# Patient Record
Sex: MALE | Race: WHITE | Employment: FULL TIME | URBAN - METROPOLITAN AREA
[De-identification: names, ages, dates, MRNs, and addresses within clinical notes are randomized per-mention and may not be internally consistent; named-entity substitution may affect disease eponyms.]

---

## 2017-08-14 PROBLEM — J18.9 PNEUMONIA: Status: ACTIVE | Noted: 2017-08-14

## 2017-08-14 PROBLEM — J45.901 ASTHMA EXACERBATION: Status: ACTIVE | Noted: 2017-08-14

## 2017-08-14 PROBLEM — R74.8 ELEVATED LIVER ENZYMES: Status: ACTIVE | Noted: 2017-08-14

## 2017-08-14 PROBLEM — A41.9 SEPSIS (HCC): Status: ACTIVE | Noted: 2017-08-14

## 2020-07-02 ENCOUNTER — HOSPITAL ENCOUNTER (OUTPATIENT)
Age: 25
Discharge: HOME | End: 2020-07-02
Payer: COMMERCIAL

## 2020-07-02 ENCOUNTER — HOSPITAL ENCOUNTER (OUTPATIENT)
Dept: GENERAL RADIOLOGY | Age: 25
Discharge: HOME | End: 2020-07-02
Payer: COMMERCIAL

## 2020-07-02 PROCEDURE — 73560 X-RAY EXAM OF KNEE 1 OR 2: CPT

## 2021-01-26 ENCOUNTER — OFFICE VISIT (OUTPATIENT)
Dept: FAMILY MEDICINE CLINIC | Age: 26
End: 2021-01-26
Payer: COMMERCIAL

## 2021-01-26 VITALS
HEART RATE: 74 BPM | WEIGHT: 210 LBS | OXYGEN SATURATION: 98 % | SYSTOLIC BLOOD PRESSURE: 108 MMHG | HEIGHT: 70 IN | DIASTOLIC BLOOD PRESSURE: 66 MMHG | TEMPERATURE: 97.1 F | BODY MASS INDEX: 30.06 KG/M2

## 2021-01-26 DIAGNOSIS — E55.9 VITAMIN D DEFICIENCY: ICD-10-CM

## 2021-01-26 DIAGNOSIS — Z91.018 MULTIPLE FOOD ALLERGIES: ICD-10-CM

## 2021-01-26 DIAGNOSIS — K76.0 FATTY LIVER: Primary | ICD-10-CM

## 2021-01-26 PROCEDURE — 99203 OFFICE O/P NEW LOW 30 MIN: CPT | Performed by: NURSE PRACTITIONER

## 2021-01-26 SDOH — HEALTH STABILITY: MENTAL HEALTH: HOW OFTEN DO YOU HAVE A DRINK CONTAINING ALCOHOL?: MONTHLY OR LESS

## 2021-01-26 SDOH — HEALTH STABILITY: MENTAL HEALTH: HOW MANY STANDARD DRINKS CONTAINING ALCOHOL DO YOU HAVE ON A TYPICAL DAY?: 1 OR 2

## 2021-01-26 ASSESSMENT — ENCOUNTER SYMPTOMS
DIARRHEA: 0
COUGH: 0
CONSTIPATION: 0
BLOOD IN STOOL: 0
SHORTNESS OF BREATH: 0
CHEST TIGHTNESS: 0

## 2021-01-26 ASSESSMENT — PATIENT HEALTH QUESTIONNAIRE - PHQ9
SUM OF ALL RESPONSES TO PHQ QUESTIONS 1-9: 1

## 2021-01-26 NOTE — PROGRESS NOTES
1/26/2021    Chief Complaint   Patient presents with   Lindaann Bernheim Doctor     seen someone in [address] [place] [address] Ave remember who     Other     last July he had bloodwork at his pcp and they told him he had elevated chol and possible fatty liver, they gave him chol med but he never took it because he has allergies to alot of fillers in medications and they never really told him about the med and he didnt like that. He did lose weight and started eating better so he would like this rechecked     Other     not fasting     Other     has always had a vitamin d defeicency so he needs that checked        Marcus Patel is a 26 y.o. male, presents today for:    HPI   New patient to practice. Mother comes to this practice. Concerned about prior dx of fatty liver disease.  in 2017 but no labs for comparison. Since 2017 patient has changed his diet and lost 20 lbs. No abdominal pain/ GERD, diarrhea, constipation, N/V. Frequent Vitamin D deficiency, has needed 50,000 IU in the past.  No new fatigue. Hx numerous food and environmental allergies. Previously given EpiPen, does not need one today. Needs referral to new Allergist.      Not currently sexually active, never in the past    Review of Systems   Constitutional: Negative. Respiratory: Negative for cough, chest tightness and shortness of breath. Cardiovascular: Negative. Gastrointestinal: Negative for blood in stool, constipation and diarrhea. Musculoskeletal:        Intermittent knee pain due to accident   Skin: Negative. Neurological: Negative for dizziness, tremors, light-headedness and headaches. Psychiatric/Behavioral: Negative for decreased concentration, dysphoric mood, self-injury, sleep disturbance and suicidal ideas. The patient is not nervous/anxious.         Current Outpatient Medications on File Prior to Visit   Medication Sig Dispense Refill    CALCIUM-VITAMIN D PO Take 1 tablet by mouth daily      Cholecalciferol (VITAMIN D3 PO) Take 1 tablet by mouth daily      albuterol (PROVENTIL) (2.5 MG/3ML) 0.083% nebulizer solution Take 3 mLs by nebulization every 6 hours as needed for Wheezing 360 each 1    cetirizine (ZYRTEC) 10 MG tablet Take 10 mg by mouth daily      ALBUTEROL SULFATE IN Inhale into the lungs as needed      EPINEPHrine HCl, Anaphylaxis, (EPIPEN IM) Inject into the muscle as needed       No current facility-administered medications on file prior to visit. Allergies   Allergen Reactions    Latex     Eggs Or Egg-Derived Products Anaphylaxis    Milk-Related Compounds Anaphylaxis    Other Anaphylaxis     Tree nuts - anaphylaxis, Melon -rash, Peas-n/v.      Peanut-Containing Drug Products Anaphylaxis    Shellfish-Derived Products Anaphylaxis    Pork-Derived Products Nausea And Vomiting     Past Medical History:   Diagnosis Date    Asthma     Eosinophilic esophagitis      Past Surgical History:   Procedure Laterality Date    HYDROCELE EXCISION      KNEE SURGERY      SINUS SURGERY      TONSILLECTOMY        Social History     Tobacco Use    Smoking status: Never Smoker    Smokeless tobacco: Never Used   Substance Use Topics    Alcohol use: Yes     Frequency: Monthly or less     Drinks per session: 1 or 2     Binge frequency: Never     Comment: rarely      History reviewed. No pertinent family history. Vitals:    01/26/21 1512   BP: 108/66   Pulse: 74   Temp: 97.1 °F (36.2 °C)   TempSrc: Infrared   SpO2: 98%   Weight: 210 lb (95.3 kg)   Height: 5' 10\" (1.778 m)     Estimated body mass index is 30.13 kg/m² as calculated from the following:    Height as of this encounter: 5' 10\" (1.778 m). Weight as of this encounter: 210 lb (95.3 kg). Physical Exam  Vitals signs reviewed. Constitutional:       Appearance: Normal appearance. HENT:      Head: Normocephalic. Neck:      Vascular: No carotid bruit. Cardiovascular:      Rate and Rhythm: Normal rate and regular rhythm.

## 2021-01-27 ENCOUNTER — NURSE ONLY (OUTPATIENT)
Dept: FAMILY MEDICINE CLINIC | Age: 26
End: 2021-01-27
Payer: COMMERCIAL

## 2021-01-27 DIAGNOSIS — K76.0 FATTY LIVER: ICD-10-CM

## 2021-01-27 DIAGNOSIS — E55.9 VITAMIN D DEFICIENCY: ICD-10-CM

## 2021-01-27 LAB
A/G RATIO: 2.2 (ref 1.1–2.2)
ALBUMIN SERPL-MCNC: 5 G/DL (ref 3.4–5)
ALP BLD-CCNC: 79 U/L (ref 40–129)
ALT SERPL-CCNC: 29 U/L (ref 10–40)
ANION GAP SERPL CALCULATED.3IONS-SCNC: 13 MMOL/L (ref 3–16)
AST SERPL-CCNC: 17 U/L (ref 15–37)
BILIRUB SERPL-MCNC: 1.2 MG/DL (ref 0–1)
BUN BLDV-MCNC: 8 MG/DL (ref 7–20)
CALCIUM SERPL-MCNC: 10 MG/DL (ref 8.3–10.6)
CHLORIDE BLD-SCNC: 102 MMOL/L (ref 99–110)
CHOLESTEROL, TOTAL: 147 MG/DL (ref 0–199)
CO2: 26 MMOL/L (ref 21–32)
CREAT SERPL-MCNC: 0.8 MG/DL (ref 0.9–1.3)
GFR AFRICAN AMERICAN: >60
GFR NON-AFRICAN AMERICAN: >60
GLOBULIN: 2.3 G/DL
GLUCOSE BLD-MCNC: 89 MG/DL (ref 70–99)
HDLC SERPL-MCNC: 37 MG/DL (ref 40–60)
LDL CHOLESTEROL CALCULATED: 75 MG/DL
POTASSIUM SERPL-SCNC: 4.2 MMOL/L (ref 3.5–5.1)
SODIUM BLD-SCNC: 141 MMOL/L (ref 136–145)
TOTAL PROTEIN: 7.3 G/DL (ref 6.4–8.2)
TRIGL SERPL-MCNC: 174 MG/DL (ref 0–150)
VITAMIN D 25-HYDROXY: 26.5 NG/ML
VLDLC SERPL CALC-MCNC: 35 MG/DL

## 2021-01-27 PROCEDURE — 36415 COLL VENOUS BLD VENIPUNCTURE: CPT | Performed by: NURSE PRACTITIONER

## 2021-01-27 NOTE — PROGRESS NOTES
Blood drawn per order. Needle size: 23 g  Site: R Upper Arm. First attempt successful Yes    Second attempt no    Pressure applied until bleeding stopped. Yes applied. Patient informed to call office or return if bleeding reoccurs and unable to stop.     Tubes drawn: 0 purple     2 red

## 2021-08-16 ENCOUNTER — VIRTUAL VISIT (OUTPATIENT)
Dept: FAMILY MEDICINE CLINIC | Age: 26
End: 2021-08-16
Payer: COMMERCIAL

## 2021-08-16 DIAGNOSIS — J45.21 MILD INTERMITTENT ASTHMA WITH (ACUTE) EXACERBATION: ICD-10-CM

## 2021-08-16 DIAGNOSIS — U07.1 COVID-19: Primary | ICD-10-CM

## 2021-08-16 PROBLEM — A41.9 SEPSIS (HCC): Status: RESOLVED | Noted: 2017-08-14 | Resolved: 2021-08-16

## 2021-08-16 PROBLEM — J18.9 PNEUMONIA: Status: RESOLVED | Noted: 2017-08-14 | Resolved: 2021-08-16

## 2021-08-16 PROBLEM — J45.901 ASTHMA EXACERBATION: Status: RESOLVED | Noted: 2017-08-14 | Resolved: 2021-08-16

## 2021-08-16 PROBLEM — R74.8 ELEVATED LIVER ENZYMES: Status: RESOLVED | Noted: 2017-08-14 | Resolved: 2021-08-16

## 2021-08-16 PROCEDURE — 99213 OFFICE O/P EST LOW 20 MIN: CPT | Performed by: NURSE PRACTITIONER

## 2021-08-16 ASSESSMENT — ENCOUNTER SYMPTOMS
COUGH: 1
VOMITING: 0
NAUSEA: 1
SWOLLEN GLANDS: 0
CHANGE IN BOWEL HABIT: 0
VISUAL CHANGE: 0
SORE THROAT: 1

## 2021-08-16 NOTE — TELEPHONE ENCOUNTER
Patient needs regeneron infusion.   Need order printed off and faxed to Valeriano Adam at [id number] [facility] [phone] or [phone]

## 2021-08-16 NOTE — TELEPHONE ENCOUNTER
Office note reviewed. Tested positive for Covid today. Okay to send Regeneron order to IV infusion center at [address] [facility].   I can sign the order

## 2021-08-16 NOTE — PROGRESS NOTES
Jenelle Vargas (:  1995) is a 23 y.o. male,Established patient, here for evaluation of the following chief complaint(s): Positive For Covid-19 (got tested today at [facility] test is positive ), Pharyngitis (symptoms started wednesday ), Cough, and Nausea         ASSESSMENT/PLAN:  1. COVID-19  Will order Regeneron for patient given asthma history (with prior hospitalization)  Encouraged continued supportive care. Continue Mucinex DM PRN cough  Continue Albuterol PRN  2. Mild intermittent asthma with (acute) exacerbation  Due to COVID  See #1      Return if symptoms worsen or fail to improve. SUBJECTIVE/OBJECTIVE:  Pharyngitis  This is a new problem. Episode onset: 5 days ago. The problem occurs constantly (5-6x/ hours). The problem has been gradually worsening. Associated symptoms include congestion, coughing, fatigue, a fever (low grade 100 degrees), headaches, nausea, neck pain, a sore throat and weakness. Pertinent negatives include no anorexia, arthralgias, change in bowel habit, chest pain, chills, diaphoresis, joint swelling, myalgias, numbness, rash, swollen glands, urinary symptoms, vertigo, visual change or vomiting. The symptoms are aggravated by coughing. Treatments tried: sudafed, guaifenesin, zinc. The treatment provided mild relief. Cough  Associated symptoms include a fever (low grade 100 degrees), headaches and a sore throat. Pertinent negatives include no chest pain, chills, myalgias or rash. Phlegm- green/ clear. Tested positive today for covid. Did not receive COVID vaccination. Multiple family members have tested positive for COVID who live in his household. They all have asthma similar to him. Have all received IV Regeneron and have markedly improved symptoms. Pt is requesting to receive this as well. Review of Systems   Constitutional: Positive for fatigue and fever (low grade 100 degrees). Negative for chills and diaphoresis.    HENT: Positive for congestion and sore throat. Respiratory: Positive for cough. Cardiovascular: Negative for chest pain. Gastrointestinal: Positive for nausea. Negative for anorexia, change in bowel habit and vomiting. Musculoskeletal: Positive for neck pain. Negative for arthralgias, joint swelling and myalgias. Skin: Negative for rash. Neurological: Positive for weakness and headaches. Negative for vertigo and numbness.        Patient-Reported Vitals 8/16/2021   Patient-Reported Weight 220lb   Patient-Reported Height 5 10   Patient-Reported Systolic 432   Patient-Reported Diastolic 76   Patient-Reported Pulse 86   Patient-Reported Temperature 99.8   Patient-Reported SpO2 98     [INSTRUCTIONS:  \"[x]\" Indicates a positive item  \"[]\" Indicates a negative item  -- DELETE ALL ITEMS NOT EXAMINED]    Constitutional: [x] Appears well-developed and well-nourished [x] No apparent distress      [] Abnormal -     Mental status: [x] Alert and awake  [x] Oriented to person/place/time [x] Able to follow commands    [] Abnormal -     Eyes:   EOM    [x]  Normal    [] Abnormal -   Sclera  [x]  Normal    [] Abnormal -          Discharge [x]  None visible   [] Abnormal -     HENT: [x] Normocephalic, atraumatic  [] Abnormal -   [x] Mouth/Throat: Mucous membranes are moist    External Ears [x] Normal  [] Abnormal -    Neck: [x] No visualized mass [] Abnormal -     Pulmonary/Chest: [x] Respiratory effort normal   [x] No visualized signs of difficulty breathing or respiratory distress        [] Abnormal -   No coughing during VV     Musculoskeletal:   [x] Normal gait with no signs of ataxia         [x] Normal range of motion of neck        [] Abnormal -     Neurological:        [x] No Facial Asymmetry (Cranial nerve 7 motor function) (limited exam due to video visit)          [x] No gaze palsy        [] Abnormal -          Skin:        [x] No significant exanthematous lesions or discoloration noted on facial skin         [] Abnormal - Psychiatric:       [x] Normal Affect [] Abnormal -        [x] No Hallucinations    Other pertinent observable physical exam findings:-          On this date 8/16/2021 I have spent 2 minutes reviewing previous notes, test results and face to face (virtual) with the patient discussing the diagnosis and importance of compliance with the treatment plan as well as documenting on the day of the visitAmanuel Inna Flores, was evaluated through a synchronous (real-time) audio-video encounter. The patient (or guardian if applicable) is aware that this is a billable service. Verbal consent to proceed has been obtained within the past 12 months. The visit was conducted pursuant to the emergency declaration under the [facility], [address] [place] [address] authority and the [employer] General Act. Patient identification was verified, and a caregiver was present when appropriate. The patient was located in a state where the provider was credentialed to provide care. An electronic signature was used to authenticate this note.     --LEYDI Morales - CNP

## 2021-08-19 ENCOUNTER — TELEPHONE (OUTPATIENT)
Dept: FAMILY MEDICINE CLINIC | Age: 26
End: 2021-08-19

## 2021-08-19 NOTE — TELEPHONE ENCOUNTER
I spoke to Irena Damon from [facility] office and she said they faxed it last week,  I called Jody Hernnadez at the transfusion Dept and she said they did not get the order.   So I had Irena Damon refax it and she confirmed with Jody Hernandez that they received it

## 2021-08-19 NOTE — TELEPHONE ENCOUNTER
Patients  is not set up unable to leave a msg.   So I called his mom and she said they got in touch with him and he is scheduled for tomorrow for the [facility]

## 2021-08-19 NOTE — TELEPHONE ENCOUNTER
Patient had a virtual visit with Darcy Tello DNP on 8-16-21 for covid. Patient says that she was going to order Antibody.  Calling for status or instructions

## 2021-08-20 ENCOUNTER — HOSPITAL ENCOUNTER (OUTPATIENT)
Dept: NURSING | Age: 26
Setting detail: INFUSION SERIES
Discharge: HOME | End: 2021-08-20
Payer: COMMERCIAL

## 2021-08-20 VITALS
RESPIRATION RATE: 16 BRPM | HEIGHT: 70 IN | SYSTOLIC BLOOD PRESSURE: 120 MMHG | WEIGHT: 220 LBS | BODY MASS INDEX: 31.5 KG/M2 | DIASTOLIC BLOOD PRESSURE: 72 MMHG | OXYGEN SATURATION: 98 % | HEART RATE: 75 BPM | TEMPERATURE: 98 F

## 2021-08-20 PROCEDURE — 2580000003 HC RX 258: Performed by: FAMILY MEDICINE

## 2021-08-20 PROCEDURE — 99203 OFFICE O/P NEW LOW 30 MIN: CPT

## 2021-08-20 PROCEDURE — 96365 THER/PROPH/DIAG IV INF INIT: CPT

## 2021-08-20 PROCEDURE — 6360000002 HC RX W HCPCS: Performed by: FAMILY MEDICINE

## 2021-08-20 PROCEDURE — 2500000003 HC RX 250 WO HCPCS: Performed by: FAMILY MEDICINE

## 2021-08-20 RX ADMIN — IMDEVIMAB: 300 INJECTION, SOLUTION, CONCENTRATE INTRAVENOUS at 13:02

## 2021-08-20 NOTE — PROGRESS NOTES
Patient has tolerated his infusion of regeneron per IV. Monitored patient for one hour post infusion. Remained in NSR the entire time. Discharge instructions given verbally and written. Verbalized understanding back. Left ambulatory in stable condition.

## 2022-01-11 ENCOUNTER — OFFICE VISIT (OUTPATIENT)
Dept: FAMILY MEDICINE CLINIC | Age: 27
End: 2022-01-11
Payer: COMMERCIAL

## 2022-01-11 VITALS
SYSTOLIC BLOOD PRESSURE: 118 MMHG | TEMPERATURE: 97.9 F | BODY MASS INDEX: 32.71 KG/M2 | HEART RATE: 74 BPM | OXYGEN SATURATION: 96 % | DIASTOLIC BLOOD PRESSURE: 76 MMHG | WEIGHT: 228 LBS

## 2022-01-11 DIAGNOSIS — Z91.018 MULTIPLE FOOD ALLERGIES: ICD-10-CM

## 2022-01-11 DIAGNOSIS — K20.0 EOSINOPHILIC ESOPHAGITIS: Primary | ICD-10-CM

## 2022-01-11 DIAGNOSIS — K76.0 FATTY LIVER: ICD-10-CM

## 2022-01-11 DIAGNOSIS — J45.20 MILD INTERMITTENT ASTHMA WITHOUT COMPLICATION: ICD-10-CM

## 2022-01-11 DIAGNOSIS — E55.9 VITAMIN D DEFICIENCY: ICD-10-CM

## 2022-01-11 PROBLEM — J45.21 MILD INTERMITTENT ASTHMA WITH (ACUTE) EXACERBATION: Status: ACTIVE | Noted: 2022-01-11

## 2022-01-11 PROCEDURE — 99214 OFFICE O/P EST MOD 30 MIN: CPT | Performed by: NURSE PRACTITIONER

## 2022-01-11 RX ORDER — ALBUTEROL SULFATE 90 UG/1
2 AEROSOL, METERED RESPIRATORY (INHALATION) EVERY 6 HOURS PRN
Qty: 18 G | Refills: 3 | Status: SHIPPED | OUTPATIENT
Start: 2022-01-11

## 2022-01-11 RX ORDER — PANTOPRAZOLE SODIUM 40 MG/1
40 TABLET, DELAYED RELEASE ORAL
Qty: 28 TABLET | Refills: 0 | Status: SHIPPED | OUTPATIENT
Start: 2022-01-11 | End: 2022-01-25

## 2022-01-11 RX ORDER — FLUTICASONE PROPIONATE 220 UG/1
2 AEROSOL, METERED RESPIRATORY (INHALATION) 2 TIMES DAILY
Qty: 1 EACH | Refills: 1 | Status: SHIPPED | OUTPATIENT
Start: 2022-01-11 | End: 2022-02-10

## 2022-01-11 NOTE — PROGRESS NOTES
1/11/2022    Chief Complaint   Patient presents with    Allergies     would like to get a referral to ENT because his allergies have been acting up     Abdominal Pain     after he eats certain things     Nausea     this happens at least twice a week he thinks it is from the foods he is eating       Jacquelin Patel is a 23 y.o. male, presents today for:      ASSESSMENT/PLAN:    1. Eosinophilic esophagitis  Will treat for EoE flare, start PPI and Flovent. Instructed patient to swallow and NOT inhale Flovent to decrease inflammation. Will obtain labs to determine level of eosinophils. Referral to GI and Allergy for EGD given no EGD for several years and Allergist for updated Allergy testing  - pantoprazole (PROTONIX) 40 MG tablet; Take 1 tablet by mouth 2 times daily (before meals) for 14 days  Dispense: 28 tablet; Refill: 0  - fluticasone (FLOVENT HFA) 220 MCG/ACT inhaler; Inhale 2 puffs into the lungs 2 times daily  Dispense: 1 each; Refill: 1  - Amb External Referral To Gastroenterology  - CBC Auto Differential  - COMPREHENSIVE METABOLIC PANEL  - External Referral To Allergy    2. Mild intermittent asthma without complication  Stable today  Continue PRN Albuterol    3. Fatty liver  Updated labs ordered today  Encouraged weight loss, monitoring of portions  - Lipid, Fasting    4. Vitamin D deficiency  Labs today  - Vitamin D 25 Hydroxy    5. Multiple food allergies  Encouraged avoidances of causative foods. No follow-ups on file. Presenting today for concern of new Food allergies. Requesting referral for Allergy testing. Recently moved back home from [facility] after losing his job. Thinks having flare of eosinophilic esophogitis. Worsening nausea, hearburn, abdominal discomfort after eating. Dx as a child. Multiple treatments in the past including Flovent which he feels helped. EGD confirmed diagnoses but none in at least 5 years.       Long history of food allergy testing that has changed as he has gotten older. Known allergies: milk peanut eggs shellfish. Developed diarrhea, abdominal discomfort and intermittent nausea. Then was told he had developed allergies to Pork turkey sesame, mustard, melon, legumes in his teens. Did do food challenge of adding eggs back in to diet- developed hives and SOB within several days so canceled milk challenge. Asthma currently well controlled. No need for his Albuterol inhaler. No SOB, leg swelling. NO daytime or nighttime symptoms. Lab Results   Component Value Date    CHOL 147 01/27/2021     Lab Results   Component Value Date    TRIG 174 (H) 01/27/2021     Lab Results   Component Value Date    HDL 45 01/11/2022    HDL 37 (L) 01/27/2021     Lab Results   Component Value Date    LDLCALC see below 01/11/2022    [facility] 75 01/27/2021     Lab Results   Component Value Date    LABVLDL see below 01/11/2022    LABVLDL 35 01/27/2021     No results found for: [facility]    Lab Results   Component Value Date     01/11/2022    K 4.5 01/11/2022    CL 99 01/11/2022    CO2 25 01/11/2022    BUN 13 01/11/2022    CREATININE 0.9 01/11/2022    GLUCOSE 83 01/11/2022    CALCIUM 10.1 01/11/2022    PROT 7.8 01/11/2022    LABALBU 5.1 (H) 01/11/2022    BILITOT 1.4 (H) 01/11/2022    ALKPHOS 73 01/11/2022    AST 28 01/11/2022    ALT 53 (H) 01/11/2022    LABGLOM >60 01/11/2022    GFRAA >60 01/11/2022    AGRATIO 1.9 01/11/2022    GLOB 2.3 01/27/2021     Review of Systems   Constitutional: Negative for activity change, appetite change, chills, diaphoresis, fatigue, fever and unexpected weight change. HENT: Negative for congestion, dental problem, drooling, ear discharge, ear pain, facial swelling, hearing loss, mouth sores, nosebleeds, postnasal drip, rhinorrhea, sinus pressure, sinus pain, sneezing, sore throat, tinnitus, trouble swallowing and voice change. Respiratory: Negative for cough, choking, chest tightness, shortness of breath and wheezing. Cardiovascular: Negative for chest pain, palpitations and leg swelling. Gastrointestinal: Positive for abdominal pain and nausea. Negative for abdominal distention, blood in stool, constipation, diarrhea and vomiting. Genitourinary: Negative. Musculoskeletal: Negative. Skin: Negative. Neurological: Negative. Psychiatric/Behavioral: Negative. Current Outpatient Medications on File Prior to Visit   Medication Sig Dispense Refill    CALCIUM-VITAMIN D PO Take 1 tablet by mouth daily      Cholecalciferol (VITAMIN D3 PO) Take 1 tablet by mouth daily      albuterol (PROVENTIL) (2.5 MG/3ML) 0.083% nebulizer solution Take 3 mLs by nebulization every 6 hours as needed for Wheezing 360 each 1    cetirizine (ZYRTEC) 10 MG tablet Take 10 mg by mouth daily      EPINEPHrine HCl, Anaphylaxis, (EPIPEN IM) Inject into the muscle as needed       No current facility-administered medications on file prior to visit. Allergies   Allergen Reactions    Latex     Eggs Or Egg-Derived Products Anaphylaxis    Milk-Related Compounds Anaphylaxis    Other Anaphylaxis     Tree nuts - anaphylaxis, Melon -rash, Peas-n/v.      Peanut-Containing Drug Products Anaphylaxis    Shellfish-Derived Products Anaphylaxis    Pork-Derived Products Nausea And Vomiting     Past Medical History:   Diagnosis Date    Asthma     Eosinophilic esophagitis     confirmed by biopsy    Fatty liver     Food allergy     Milk, peanut, eggs, shellfish, pork, turkey, seasme, melon, legumes    Vitamin D deficiency     recurrent     Past Surgical History:   Procedure Laterality Date    HYDROCELE EXCISION      KNEE SURGERY      SINUS SURGERY      TONSILLECTOMY       Social History     Tobacco Use    Smoking status: Never Smoker    Smokeless tobacco: Never Used   Substance Use Topics    Alcohol use: Yes     Comment: rarely     No family history on file.     Vitals:    01/11/22 1603   BP: 118/76   Pulse: 74   Temp: 97.9 °F (36.6 °C) TempSrc: Infrared   SpO2: 96%   Weight: 228 lb (103.4 kg)     Estimated body mass index is 32.71 kg/m² as calculated from the following:    Height as of 8/20/21: 5' 10\" (1.778 m). Weight as of this encounter: 228 lb (103.4 kg). Physical Exam  Vitals and nursing note reviewed. Constitutional:       Appearance: Normal appearance. He is obese. HENT:      Head: Normocephalic. Neck:      Vascular: No carotid bruit. Cardiovascular:      Rate and Rhythm: Normal rate and regular rhythm. Pulses: Normal pulses. Carotid pulses are 2+ on the right side and 2+ on the left side. Dorsalis pedis pulses are 2+ on the right side and 2+ on the left side. Posterior tibial pulses are 2+ on the right side and 2+ on the left side. Heart sounds: Normal heart sounds. No murmur heard. No gallop. Pulmonary:      Effort: Pulmonary effort is normal.      Breath sounds: Normal breath sounds. No wheezing or rhonchi. Abdominal:      General: Abdomen is flat. There is no distension. Palpations: Abdomen is soft. There is no mass. Tenderness: There is no abdominal tenderness. Hernia: No hernia is present. Musculoskeletal:         General: Normal range of motion. Cervical back: Normal range of motion. Right lower leg: No edema. Left lower leg: No edema. Lymphadenopathy:      Cervical: No cervical adenopathy. Skin:     General: Skin is warm and dry. Capillary Refill: Capillary refill takes less than 2 seconds. Neurological:      General: No focal deficit present. Mental Status: He is alert and oriented to person, place, and time. Psychiatric:         Mood and Affect: Mood normal.         Behavior: Behavior normal.           Patient's questions answered and concerns addressed. Patient agrees to plan of care.         Electronically signed by LEYDI Morales CNP on 1/16/2022 at 9:44 AM

## 2022-01-12 LAB
A/G RATIO: 1.9 (ref 1.1–2.2)
ALBUMIN SERPL-MCNC: 5.1 G/DL (ref 3.4–5)
ALP BLD-CCNC: 73 U/L (ref 40–129)
ALT SERPL-CCNC: 53 U/L (ref 10–40)
ANION GAP SERPL CALCULATED.3IONS-SCNC: 15 MMOL/L (ref 3–16)
AST SERPL-CCNC: 28 U/L (ref 15–37)
BASOPHILS ABSOLUTE: 0 K/UL (ref 0–0.2)
BASOPHILS RELATIVE PERCENT: 0.7 %
BILIRUB SERPL-MCNC: 1.4 MG/DL (ref 0–1)
BUN BLDV-MCNC: 13 MG/DL (ref 7–20)
CALCIUM SERPL-MCNC: 10.1 MG/DL (ref 8.3–10.6)
CHLORIDE BLD-SCNC: 99 MMOL/L (ref 99–110)
CHOLESTEROL, FASTING: 239 MG/DL (ref 0–199)
CO2: 25 MMOL/L (ref 21–32)
CREAT SERPL-MCNC: 0.9 MG/DL (ref 0.9–1.3)
EOSINOPHILS ABSOLUTE: 0.2 K/UL (ref 0–0.6)
EOSINOPHILS RELATIVE PERCENT: 2.8 %
GFR AFRICAN AMERICAN: >60
GFR NON-AFRICAN AMERICAN: >60
GLUCOSE BLD-MCNC: 83 MG/DL (ref 70–99)
HCT VFR BLD CALC: 45.2 % (ref 40.5–52.5)
HDLC SERPL-MCNC: 45 MG/DL (ref 40–60)
HEMOGLOBIN: 15.7 G/DL (ref 13.5–17.5)
LDL CHOLESTEROL CALCULATED: ABNORMAL MG/DL
LDL CHOLESTEROL DIRECT: 170 MG/DL
LYMPHOCYTES ABSOLUTE: 2.1 K/UL (ref 1–5.1)
LYMPHOCYTES RELATIVE PERCENT: 30 %
MCH RBC QN AUTO: 30.3 PG (ref 26–34)
MCHC RBC AUTO-ENTMCNC: 34.8 G/DL (ref 31–36)
MCV RBC AUTO: 87.1 FL (ref 80–100)
MONOCYTES ABSOLUTE: 0.5 K/UL (ref 0–1.3)
MONOCYTES RELATIVE PERCENT: 6.7 %
NEUTROPHILS ABSOLUTE: 4.2 K/UL (ref 1.7–7.7)
NEUTROPHILS RELATIVE PERCENT: 59.8 %
PDW BLD-RTO: 12.7 % (ref 12.4–15.4)
PLATELET # BLD: 236 K/UL (ref 135–450)
PMV BLD AUTO: 8.2 FL (ref 5–10.5)
POTASSIUM SERPL-SCNC: 4.5 MMOL/L (ref 3.5–5.1)
RBC # BLD: 5.19 M/UL (ref 4.2–5.9)
SODIUM BLD-SCNC: 139 MMOL/L (ref 136–145)
TOTAL PROTEIN: 7.8 G/DL (ref 6.4–8.2)
TRIGLYCERIDE, FASTING: 317 MG/DL (ref 0–150)
VITAMIN D 25-HYDROXY: 18.8 NG/ML
VLDLC SERPL CALC-MCNC: ABNORMAL MG/DL
WBC # BLD: 7.1 K/UL (ref 4–11)

## 2022-01-16 PROBLEM — J45.21 MILD INTERMITTENT ASTHMA WITH (ACUTE) EXACERBATION: Status: RESOLVED | Noted: 2022-01-11 | Resolved: 2022-01-16

## 2022-01-16 PROBLEM — K76.0 FATTY LIVER: Status: ACTIVE | Noted: 2022-01-16

## 2022-01-16 PROBLEM — Z91.018 MULTIPLE FOOD ALLERGIES: Status: ACTIVE | Noted: 2022-01-16

## 2022-01-16 PROBLEM — J45.20 MILD INTERMITTENT ASTHMA WITHOUT COMPLICATION: Status: ACTIVE | Noted: 2022-01-16

## 2022-01-16 PROBLEM — E55.9 VITAMIN D DEFICIENCY: Status: ACTIVE | Noted: 2022-01-16

## 2022-01-16 ASSESSMENT — ENCOUNTER SYMPTOMS
TROUBLE SWALLOWING: 0
SINUS PRESSURE: 0
BLOOD IN STOOL: 0
DIARRHEA: 0
VOMITING: 0
SORE THROAT: 0
VOICE CHANGE: 0
CHEST TIGHTNESS: 0
CONSTIPATION: 0
COUGH: 0
NAUSEA: 1
SHORTNESS OF BREATH: 0
ABDOMINAL PAIN: 1
ABDOMINAL DISTENTION: 0
RHINORRHEA: 0
CHOKING: 0
FACIAL SWELLING: 0
WHEEZING: 0
SINUS PAIN: 0

## 2022-10-27 ENCOUNTER — ANESTHESIA EVENT (OUTPATIENT)
Dept: ENDOSCOPY | Age: 27
End: 2022-10-27
Payer: MEDICAID

## 2022-10-28 ENCOUNTER — ANESTHESIA (OUTPATIENT)
Dept: ENDOSCOPY | Age: 27
End: 2022-10-28
Payer: MEDICAID

## 2022-10-28 ENCOUNTER — HOSPITAL ENCOUNTER (OUTPATIENT)
Age: 27
Setting detail: OUTPATIENT SURGERY
Discharge: HOME | End: 2022-10-28
Attending: INTERNAL MEDICINE | Admitting: INTERNAL MEDICINE
Payer: MEDICAID

## 2022-10-28 VITALS
OXYGEN SATURATION: 95 % | BODY MASS INDEX: 31.5 KG/M2 | RESPIRATION RATE: 20 BRPM | DIASTOLIC BLOOD PRESSURE: 61 MMHG | HEART RATE: 72 BPM | WEIGHT: 220 LBS | TEMPERATURE: 97.5 F | HEIGHT: 70 IN | SYSTOLIC BLOOD PRESSURE: 107 MMHG

## 2022-10-28 DIAGNOSIS — R13.10 DYSPHAGIA, UNSPECIFIED TYPE: ICD-10-CM

## 2022-10-28 PROCEDURE — 7100000011 HC PHASE II RECOVERY - ADDTL 15 MIN: Performed by: INTERNAL MEDICINE

## 2022-10-28 PROCEDURE — 2580000003 HC RX 258: Performed by: ANESTHESIOLOGY

## 2022-10-28 PROCEDURE — 88305 TISSUE EXAM BY PATHOLOGIST: CPT

## 2022-10-28 PROCEDURE — 6360000002 HC RX W HCPCS: Performed by: NURSE ANESTHETIST, CERTIFIED REGISTERED

## 2022-10-28 PROCEDURE — 3700000000 HC ANESTHESIA ATTENDED CARE: Performed by: INTERNAL MEDICINE

## 2022-10-28 PROCEDURE — 2709999900 HC NON-CHARGEABLE SUPPLY: Performed by: INTERNAL MEDICINE

## 2022-10-28 PROCEDURE — 3609012400 HC EGD TRANSORAL BIOPSY SINGLE/MULTIPLE: Performed by: INTERNAL MEDICINE

## 2022-10-28 PROCEDURE — 7100000010 HC PHASE II RECOVERY - FIRST 15 MIN: Performed by: INTERNAL MEDICINE

## 2022-10-28 PROCEDURE — 2500000003 HC RX 250 WO HCPCS: Performed by: NURSE ANESTHETIST, CERTIFIED REGISTERED

## 2022-10-28 RX ORDER — SODIUM CHLORIDE, SODIUM LACTATE, POTASSIUM CHLORIDE, CALCIUM CHLORIDE 600; 310; 30; 20 MG/100ML; MG/100ML; MG/100ML; MG/100ML
INJECTION, SOLUTION INTRAVENOUS CONTINUOUS
Status: DISCONTINUED | OUTPATIENT
Start: 2022-10-28 | End: 2022-10-28 | Stop reason: HOSPADM

## 2022-10-28 RX ORDER — SODIUM CHLORIDE, SODIUM LACTATE, POTASSIUM CHLORIDE, CALCIUM CHLORIDE 600; 310; 30; 20 MG/100ML; MG/100ML; MG/100ML; MG/100ML
INJECTION, SOLUTION INTRAVENOUS ONCE
Status: DISCONTINUED | OUTPATIENT
Start: 2022-10-28 | End: 2022-10-28 | Stop reason: HOSPADM

## 2022-10-28 RX ORDER — SODIUM CHLORIDE 0.9 % (FLUSH) 0.9 %
5-40 SYRINGE (ML) INJECTION PRN
Status: DISCONTINUED | OUTPATIENT
Start: 2022-10-28 | End: 2022-10-28 | Stop reason: HOSPADM

## 2022-10-28 RX ORDER — LIDOCAINE HYDROCHLORIDE 20 MG/ML
INJECTION, SOLUTION EPIDURAL; INFILTRATION; INTRACAUDAL; PERINEURAL PRN
Status: DISCONTINUED | OUTPATIENT
Start: 2022-10-28 | End: 2022-10-28 | Stop reason: SDUPTHER

## 2022-10-28 RX ORDER — PROPOFOL 10 MG/ML
INJECTION, EMULSION INTRAVENOUS PRN
Status: DISCONTINUED | OUTPATIENT
Start: 2022-10-28 | End: 2022-10-28 | Stop reason: SDUPTHER

## 2022-10-28 RX ORDER — SODIUM CHLORIDE 9 MG/ML
INJECTION, SOLUTION INTRAVENOUS PRN
Status: DISCONTINUED | OUTPATIENT
Start: 2022-10-28 | End: 2022-10-28 | Stop reason: HOSPADM

## 2022-10-28 RX ORDER — SODIUM CHLORIDE 0.9 % (FLUSH) 0.9 %
5-40 SYRINGE (ML) INJECTION EVERY 12 HOURS SCHEDULED
Status: DISCONTINUED | OUTPATIENT
Start: 2022-10-28 | End: 2022-10-28 | Stop reason: HOSPADM

## 2022-10-28 RX ORDER — LIDOCAINE HYDROCHLORIDE 10 MG/ML
0.1 INJECTION, SOLUTION EPIDURAL; INFILTRATION; INTRACAUDAL; PERINEURAL
Status: DISCONTINUED | OUTPATIENT
Start: 2022-10-28 | End: 2022-10-28 | Stop reason: HOSPADM

## 2022-10-28 RX ADMIN — PROPOFOL 50 MG: 10 INJECTION, EMULSION INTRAVENOUS at 14:20

## 2022-10-28 RX ADMIN — PROPOFOL 200 MG: 10 INJECTION, EMULSION INTRAVENOUS at 14:15

## 2022-10-28 RX ADMIN — SODIUM CHLORIDE, POTASSIUM CHLORIDE, SODIUM LACTATE AND CALCIUM CHLORIDE: 600; 310; 30; 20 INJECTION, SOLUTION INTRAVENOUS at 13:10

## 2022-10-28 RX ADMIN — LIDOCAINE HYDROCHLORIDE 100 MG: 20 INJECTION, SOLUTION EPIDURAL; INFILTRATION; INTRACAUDAL; PERINEURAL at 14:15

## 2022-10-28 RX ADMIN — PROPOFOL 50 MG: 10 INJECTION, EMULSION INTRAVENOUS at 14:17

## 2022-10-28 ASSESSMENT — PAIN SCALES - GENERAL
PAINLEVEL_OUTOF10: 0
PAINLEVEL_OUTOF10: 0

## 2022-10-28 ASSESSMENT — PAIN - FUNCTIONAL ASSESSMENT: PAIN_FUNCTIONAL_ASSESSMENT: 0-10

## 2022-10-28 NOTE — PROCEDURES
EGD PROCEDURE NOTE         Esophagogastroduodenoscopy Procedure Note     Patient: Ronan Dumont MRN: [id number]   YOB: 1995 Age: 33 y.o. Sex: male   Unit: [facility] Room/Bed: [facility] Endo Pool/NONE Location: [address]    Admitting Physician: Traci Kaur     Primary Care Physician: LEYDI Kim CNP      DATE OF PROCEDURE: 10/28/2022  PROCEDURE: Esophagogastroduodenoscopy  INDICATION: This is a 33y.o. year old male who presents today Nausea  ENDOSCOPIST: Anabell Menard DO    POSTOPERATIVE DIAGNOSIS:  normal EGD    PLAN:  Await biopsies    INFORMED CONSENT:  Informed consent for esophagogastoduodenoscopy was obtained. The benefits and risks including adverse medicine reaction have been explained. The patient's questions were answered and the patient agreed to proceed. ASA:  ASA 2 - Patient with mild systemic disease with no functional limitations    SEDATION: MAC     The patient's vital signs, cardiac status, pulmonary status, abdominal status and mental status were stable for the procedure. The patient's vitals signs and respiratory function as monitored by oxygen saturation were stable throughout    Procedure Details: The Olympus videoendoscope was inserted into the mouth and carefully passed into the esophagus, through the stomach and to the distal duodenum. Antegrade and retrograde examination of the upper gi tract was carefully performed. Findings: The esophagus is normal.  The z-line is distinct without lesions or irregularities. There is no evidence of Mcdonald's esophagus. Biopsies of the proximal esophagus using cold biopsy forceps,  Were obtained to rule out EOE. There was no endoscopic evidence of EOE. The scope easily passed into the stomach. Biopsies of the antrum were obtained with cold biopsy forceps.   The mucosa of the cardia, fundus, body and antrum of the stomach is normal.  The pylorus is patent and of normal contour. The scope easily advanced into the duodenal bulb and down to the distal duodenum.   Ante-and retrograde exam of the duodenum is normal.    Gastric or Duodenal ulcer present: No    Estimated Blood Loss: Minimal      Signed By: Louis Cooper DO

## 2022-10-28 NOTE — PROGRESS NOTES
Pt arrives in PACU resting quietly. Resp easy and regular. Pt sleeping. Unable to arouse pt with verbal stimulation. LR infusing 550 LTC. Report from [facility] from [place].

## 2022-10-28 NOTE — ANESTHESIA POSTPROCEDURE EVALUATION
Department of Anesthesiology  Postprocedure Note    Patient: Gema Francisco  MRN: [id number]  YOB: 1995  Date of evaluation: 10/28/2022      Procedure Summary     Date: 10/28/22 Room / Location: [facility] [address]    Anesthesia Start: 6392 Anesthesia Stop: 3886    Procedure: EGD BIOPSY Diagnosis:       Dysphagia, unspecified type      (DYSPHAGIA)    Surgeons: Ronan Phelan DO Responsible Provider: Samreen Mckeon MD    Anesthesia Type: MAC ASA Status: 2          Anesthesia Type: No value filed.     Lamont Phase I: Lamont Score: 10    Lamont Phase II:        Anesthesia Post Evaluation    Patient location during evaluation: PACU  Level of consciousness: awake  Airway patency: patent  Nausea & Vomiting: no nausea  Complications: no  Cardiovascular status: blood pressure returned to baseline  Respiratory status: acceptable  Hydration status: euvolemic

## 2022-10-28 NOTE — ANESTHESIA PRE PROCEDURE
Department of Anesthesiology  Preprocedure Note       Name:  Nolan Roman   Age:  33 y.o.  :  1995                                          MRN:  [id number]         Date:  10/28/2022      Surgeon: Dinora Garg):  Noe Man DO    Procedure: Procedure(s):  EGD    Medications prior to admission:   Prior to Admission medications    Medication Sig Start Date End Date Taking?  Authorizing Provider   albuterol sulfate  (90 Base) MCG/ACT inhaler Inhale 2 puffs into the lungs every 6 hours as needed for Wheezing 22   LEYDI Medrano CNP   pantoprazole (PROTONIX) 40 MG tablet Take 1 tablet by mouth 2 times daily (before meals) for 14 days 22  LEYDI Medrano CNP   fluticasone (FLOVENT HFA) 220 MCG/ACT inhaler Inhale 2 puffs into the lungs 2 times daily 1/11/22 2/10/22  LEYDI Medrano CNP   CALCIUM-VITAMIN D PO Take 1 tablet by mouth daily    Historical Provider, MD   Cholecalciferol (VITAMIN D3 PO) Take 1 tablet by mouth daily    Historical Provider, MD   albuterol (PROVENTIL) (2.5 MG/3ML) 0.083% nebulizer solution Take 3 mLs by nebulization every 6 hours as needed for Wheezing 8/15/17 1/26/22  Lee Ann Mott MD   cetirizine (ZYRTEC) 10 MG tablet Take 10 mg by mouth daily    Historical Provider, MD   EPINEPHrine HCl, Anaphylaxis, (EPIPEN IM) Inject into the muscle as needed    Historical Provider, MD       Current medications:    Current Facility-Administered Medications   Medication Dose Route Frequency Provider Last Rate Last Admin    lactated ringers infusion   IntraVENous Once Driss Menard, DO        lidocaine PF 1 % injection 0.1 mL  0.1 mL IntraDERmal Once PRN Driss Menard, DO        lactated ringers infusion   IntraVENous Continuous Lazara Almeida MD        sodium chloride flush 0.9 % injection 5-40 mL  5-40 mL IntraVENous 2 times per day Lazara Almeida MD        sodium chloride flush 0.9 % injection 5-40 mL 5-40 mL IntraVENous PRN Sarah Barajas MD        0.9 % sodium chloride infusion   IntraVENous PRN Sarah Barajas MD         Current Outpatient Medications   Medication Sig Dispense Refill    albuterol sulfate  (90 Base) MCG/ACT inhaler Inhale 2 puffs into the lungs every 6 hours as needed for Wheezing 18 g 3    pantoprazole (PROTONIX) 40 MG tablet Take 1 tablet by mouth 2 times daily (before meals) for 14 days 28 tablet 0    fluticasone (FLOVENT HFA) 220 MCG/ACT inhaler Inhale 2 puffs into the lungs 2 times daily 1 each 1    CALCIUM-VITAMIN D PO Take 1 tablet by mouth daily      Cholecalciferol (VITAMIN D3 PO) Take 1 tablet by mouth daily      albuterol (PROVENTIL) (2.5 MG/3ML) 0.083% nebulizer solution Take 3 mLs by nebulization every 6 hours as needed for Wheezing 360 each 1    cetirizine (ZYRTEC) 10 MG tablet Take 10 mg by mouth daily      EPINEPHrine HCl, Anaphylaxis, (EPIPEN IM) Inject into the muscle as needed         Allergies:     Allergies   Allergen Reactions    Latex     Eggs Or Egg-Derived Products Anaphylaxis    Milk-Related Compounds Anaphylaxis    Other Anaphylaxis     Tree nuts - anaphylaxis, Melon -rash, Peas-n/v.      Peanut-Containing Drug Products Anaphylaxis    Shellfish-Derived Products Anaphylaxis    Pork-Derived Products Nausea And Vomiting       Problem List:    Patient Active Problem List   Diagnosis Code    Eosinophilic esophagitis H90.6    Multiple food allergies Z91.018    Vitamin D deficiency E55.9    Fatty liver K76.0    Mild intermittent asthma without complication G20.66       Past Medical History:        Diagnosis Date    Asthma     Eosinophilic esophagitis     confirmed by biopsy    Fatty liver     Food allergy     Milk, peanut, eggs, shellfish, pork, turkey, seasme, melon, legumes    Vitamin D deficiency     recurrent       Past Surgical History:        Procedure Laterality Date    HYDROCELE EXCISION      KNEE SURGERY      SINUS SURGERY  TONSILLECTOMY         Social History:    Social History     Tobacco Use    Smoking status: Never    Smokeless tobacco: Never   Substance Use Topics    Alcohol use: Yes     Comment: rarely                                Counseling given: Not Answered      Vital Signs (Current): There were no vitals filed for this visit. BP Readings from Last 3 Encounters:   01/11/22 118/76   08/20/21 120/72   01/26/21 108/66       NPO Status:                                                                                 BMI:   Wt Readings from Last 3 Encounters:   01/11/22 228 lb (103.4 kg)   08/20/21 220 lb (99.8 kg)   01/26/21 210 lb (95.3 kg)     There is no height or weight on file to calculate BMI.    CBC:   Lab Results   Component Value Date/Time    WBC 7.1 01/11/2022 04:45 PM    RBC 5.19 01/11/2022 04:45 PM    HGB 15.7 01/11/2022 04:45 PM    HCT 45.2 01/11/2022 04:45 PM    MCV 87.1 01/11/2022 04:45 PM    RDW 12.7 01/11/2022 04:45 PM     01/11/2022 04:45 PM       CMP:   Lab Results   Component Value Date/Time     01/11/2022 04:45 PM    K 4.5 01/11/2022 04:45 PM    CL 99 01/11/2022 04:45 PM    CO2 25 01/11/2022 04:45 PM    BUN 13 01/11/2022 04:45 PM    CREATININE 0.9 01/11/2022 04:45 PM    GFRAA >60 01/11/2022 04:45 PM    AGRATIO 1.9 01/11/2022 04:45 PM    LABGLOM >60 01/11/2022 04:45 PM    GLUCOSE 83 01/11/2022 04:45 PM    PROT 7.8 01/11/2022 04:45 PM    CALCIUM 10.1 01/11/2022 04:45 PM    BILITOT 1.4 01/11/2022 04:45 PM    ALKPHOS 73 01/11/2022 04:45 PM    AST 28 01/11/2022 04:45 PM    ALT 53 01/11/2022 04:45 PM       POC Tests: No results for input(s): POCGLU, POCNA, POCK, POCCL, POCBUN, POCHEMO, POCHCT in the last 72 hours.     Coags: No results found for: PROTIME, INR, APTT    HCG (If Applicable): No results found for: PREGTESTUR, PREGSERUM, HCG, HCGQUANT     ABGs: No results found for: PHART, PO2ART, KUW0PQC, VDF0RXC, BEART, C8HBQUQJ     Type & Screen (If Applicable):  No results found for: LABABO, LABRH    Drug/Infectious Status (If Applicable):  No results found for: HIV, HEPCAB    COVID-19 Screening (If Applicable): No results found for: COVID19        Anesthesia Evaluation  Patient summary reviewed and Nursing notes reviewed no history of anesthetic complications:   Airway: Mallampati: II  TM distance: >3 FB   Neck ROM: full  Mouth opening: > = 3 FB   Dental: normal exam         Pulmonary:   (+) asthma:                            Cardiovascular:Negative CV ROS                      Neuro/Psych:   Negative Neuro/Psych ROS              GI/Hepatic/Renal: Neg GI/Hepatic/Renal ROS       (-) GERD, liver disease and no renal disease       Endo/Other: Negative Endo/Other ROS       (-) diabetes mellitus               Abdominal:             Vascular: negative vascular ROS. Other Findings:           Anesthesia Plan      MAC     ASA 2       Induction: intravenous. Anesthetic plan and risks discussed with patient and mother. Plan discussed with CRNA.                     Edis Tariq MD   10/28/2022

## 2022-10-28 NOTE — H&P
[address] [facility]  Outpatient Procedure H&P    Patient: Ariella Castilol MRN: [id number]     YOB: 1995  Age: 33 y.o. Sex: male    Unit: 0 [facility] Room/Bed: [facility] Pool/NONE Location: [address]     Procedure: Procedure(s):  EGD    Indication:nausea/vomiting  Referring  Physician:  Margarito Cruz     Brief history: EOE    Nurses past medical history notes reviewed and agreed. Medications reviewed.     Allergies: Latex, Eggs or egg-derived products, Milk-related compounds, Other, Peanut-containing drug products, Shellfish-derived products, and Pork-derived products     Allergies noted: Yes     Past Medical History:   Past Medical History:   Diagnosis Date    Asthma     Eosinophilic esophagitis     confirmed by biopsy    Fatty liver     Food allergy     Milk, peanut, eggs, shellfish, pork, turkey, seasme, melon, legumes    Vitamin D deficiency     recurrent       Past Surgical History:   Past Surgical History:   Procedure Laterality Date    HYDROCELE EXCISION      KNEE SURGERY      SINUS SURGERY      TONSILLECTOMY         Social History:   Social History     Socioeconomic History    Marital status: Single     Spouse name: Not on file    Number of children: Not on file    Years of education: Not on file    Highest education level: Not on file   Occupational History    Not on file   Tobacco Use    Smoking status: Never    Smokeless tobacco: Never   Substance and Sexual Activity    Alcohol use: Yes     Comment: rarely    Drug use: No    Sexual activity: Not on file   Other Topics Concern    Not on file   Social History Narrative    Not on file     Social Determinants of Health     Financial Resource Strain: Not on file   Food Insecurity: Not on file   Transportation Needs: Not on file   Physical Activity: Not on file   Stress: Not on file   Social Connections: Not on file   Intimate Partner Violence: Not on file   Housing Stability: Not on file       Family History: History - 34.0 pg Final    MCHC 01/11/2022 34.8  31.0 - 36.0 g/dL Final    RDW 01/11/2022 12.7  12.4 - 15.4 % Final    Platelets 25/82/2090 236  135 - 450 K/uL Final    MPV 01/11/2022 8.2  5.0 - 10.5 fL Final    Neutrophils % 01/11/2022 59.8  % Final    Lymphocytes % 01/11/2022 30.0  % Final    Monocytes % 01/11/2022 6.7  % Final    Eosinophils % 01/11/2022 2.8  % Final    Basophils % 01/11/2022 0.7  % Final    Neutrophils Absolute 01/11/2022 4.2  1.7 - 7.7 K/uL Final    Lymphocytes Absolute 01/11/2022 2.1  1.0 - 5.1 K/uL Final    Monocytes Absolute 01/11/2022 0.5  0.0 - 1.3 K/uL Final    Eosinophils Absolute 01/11/2022 0.2  0.0 - 0.6 K/uL Final    Basophils Absolute 01/11/2022 0.0  0.0 - 0.2 K/uL Final    Sodium 01/11/2022 139  136 - 145 mmol/L Final    Potassium 01/11/2022 4.5  3.5 - 5.1 mmol/L Final    Chloride 01/11/2022 99  99 - 110 mmol/L Final    CO2 01/11/2022 25  21 - 32 mmol/L Final    Anion Gap 01/11/2022 15  3 - 16 Final    Glucose 01/11/2022 83  70 - 99 mg/dL Final    BUN 01/11/2022 13  7 - 20 mg/dL Final    Creatinine 01/11/2022 0.9  0.9 - 1.3 mg/dL Final    GFR Non- 01/11/2022 >60  >60 Final    GFR  01/11/2022 >60  >60 Final    Calcium 01/11/2022 10.1  8.3 - 10.6 mg/dL Final    Total Protein 01/11/2022 7.8  6.4 - 8.2 g/dL Final    Albumin 01/11/2022 5.1 (A)  3.4 - 5.0 g/dL Final    Albumin/Globulin Ratio 01/11/2022 1.9  1.1 - 2.2 Final    Total Bilirubin 01/11/2022 1.4 (A)  0.0 - 1.0 mg/dL Final    Alkaline Phosphatase 01/11/2022 73  40 - 129 U/L Final    ALT 01/11/2022 53 (A)  10 - 40 U/L Final    AST 01/11/2022 28  15 - 37 U/L Final    Cholesterol, Fasting 01/11/2022 239 (A)  0 - 199 mg/dL Final    Triglyceride, Fasting 01/11/2022 317 (A)  0 - 150 mg/dL Final    HDL 01/11/2022 45  40 - 60 mg/dL Final    LDL Calculated 01/11/2022 see below  <100 mg/dL Final    VLDL Cholesterol Calculated 01/11/2022 see below  Not Established mg/dL Final    Vit D, 25-Hydroxy 01/11/2022 18.8 (A) >=30 ng/mL Final    LDL Direct 01/11/2022 170 (A)  <100 mg/dL Final        Imaging:  No orders to display       ASA:2    Mallampati Score: II    Sedation planned:MAC    Patient in acceptable condition for procedure:Yes    2:10 PM 10/28/2022    Balaji Groves, DO      Please note that some or all of this record was generated using voice recognition software. If there are any questions about the content of this document, please contact the author as some errors in transcription may have occurred. The patient and I discussed that this is an elective procedure/surgery. We discussed the risks of the procedure/surgery, including but not limited to what is outlined in the signed informed consent. We also discussed the risk of patrick COVID 19 while in the facility. We discussed the increased risk of a bad outcome should the patient contract COVID 19 during the post-procedural/post-operative period, given the patients current health condition, chronic conditions, and the added risk of COVID 19 in light of these conditions. The patient and I also discussed the risk of further postponing the procedure/surgery and other treatment alternatives, including non-procedural/surgical treatments. Understanding all of the risks, benefits, and alternatives, the patient made an informed decision to proceed with the procedure/surgery.

## 2022-10-28 NOTE — DISCHARGE INSTRUCTIONS
PATIENT INSTRUCTIONS  POST-SEDATION    Elías Lui          IMMEDIATELY FOLLOWING PROCEDURE:    Do not drive or operate machinery for the first twenty four hours after surgery. Do not make any important decisions for twenty four hours after surgery or while taking narcotic pain medications or sedatives. You should NOT BE LEFT UNATTENDED OR ALONE. A responsible adult should be with you for the rest of the day of your procedure and also during the night for your protection and safety. You may experience some light headedness. Rest at home with activity as tolerated. You may not need to go to bed, but it is important to rest for the next 24 hours. You should not engage in athletic sports such as basketball, volleyball, jogging, skating, or activities requiring refined motor skills for 24 hours. If you develop intractable nausea and vomiting or a severe headache please notify your doctor immediately. You are not expected to have any fever, but if you feel warm, take your temperature. If you have a fever 101 degrees or higher, call your doctor. If you have had an Endoscopy:   *Eat lightly for your first meal and gradually resume your normal / prescribed diet. DO NOT eat or drink until your gag reflex returns. *If you have a sore throat you may use lozenges, or salt water gargles. ONCE YOU ARE HOME, IF YOU SHOULD HAVE:  Difficulty in breathing, persistent nausea or vomiting, bleeding you feel is excessive, or pain that is unusual, increased abdominal bloating, or any swelling, fever / chills, call your physician. If you cannot contact your physician, but feel that your signs and symptoms need a physician's attention, go to the Emergency Department. FOLLOW-UP:    Please follow up with [email or web address] as scheduled or needed. Dr. Efrain Harvey DO will call you with the biopsy findings. Call Dr. Efrain Harvey DO if there are any GI concerns.  [phone]      You may be receiving a follow up phone call to ask about your care. Upper GI Endoscopy: What to Expect at [address] [place] [address]  After you have an endoscopy, you will stay at the hospital or clinic for 1 to 2 hours. This will allow the medicine to wear off. You will be able to go home after your doctor or nurse checks to make sure you are not having any problems. You may have to stay overnight if you had treatment during the test. You may have a sore throat for a day or two after the test.  This care sheet gives you a general idea about what to expect after the test.  How can you care for yourself at home? Activity  Rest as much as you need to after you go home. You should be able to go back to your usual activities the day after the test.  Diet  Follow your doctor's directions for eating after the test.  Drink plenty of fluids (unless your doctor has told you not to). Medications  If you have a sore throat the day after the test, use an over-the-counter spray to numb your throat. Follow-up care is a key part of your treatment and safety. Be sure to make and go to all appointments, and call your doctor if you are having problems. It's also a good idea to know your test results and keep a list of the medicines you take. When should you call for help? Call 911 anytime you think you may need emergency care. For example, call if:    You passed out (loses consciousness). You have trouble breathing. You pass maroon or bloody stools. Call your doctor now or seek immediate medical care if:    You have pain that does not get better after your take pain medicine. You have new or worse belly pain. You have blood in your stools. You are sick to your stomach and cannot keep fluids down. You have a fever. You cannot pass stools or gas. Watch closely for changes in your health, and be sure to contact your doctor if:    Your throat still hurts after a day or two.      You do not get better as expected. Where can you learn more? Go to [email or web address] and sign in to your [employer] account. Enter S824 in the [employer] box to learn more about \"Upper GI Endoscopy: What to Expect at Home. \"     If you do not have an account, please click on the \"Sign Up Now\" link. Current as of: May 12, 2017  Content Version: 11.6  © 1076-3432 [employer]. Care instructions adapted under license by [facility] ([facility]). If you have questions about a medical condition or this instruction, always ask your healthcare professional. [address] any warranty or liability for your use of this information. ANESTHESIA DISCHARGE INSTRUCTIONS    You are under the influence of drugs- do not drink alcohol, drive a car, operate machinery(such as power tools, kitchen appliances, etc), sign legal documents, or make any important decisions for 24 hours (or while on pain medications). Children should not ride bikes or [place] or play on gym sets  for 24 hours after surgery. A responsible adult should be with you for 24 hours. Rest at home today- increase activity as tolerated. Progress slowly to a regular diet unless your physician has instructed you otherwise. Drink plenty of water. CALL YOUR DOCTOR IF YOU:  Have moderate to severe nausea or vomiting AND are unable to hold down fluids or prescribed medications. Have bright red bloody drainage from your dressing that won't stop oozing.   Do not get relief with your pain medication    NORMAL (POSSIBLE) SIDE EFFECTS FROM ANESTHESIA:     Confusion, temporary memory loss, delayed reaction times in the first 24 hours  Lightheadedness, dizziness, difficulty focusing, blurred vision  Nausea/vomiting can happen  Shivering, feeling cold, sore throat, cough and muscle aches should stop within 24-48 hours  Trouble urinating - call your surgeon if it has been more than 8 hrs  Bruising or soreness at the IV site - call if it remains red, firm or there is drainage             FEMALES OF CHILDBEARING AGE WHO ARE TAKING BIRTH CONTROL PILLS:  You may have received a medication during your procedure that interferes with the   actions of birth control pills (Bridion or Emend). Use some other kind of birth control in addition to your pills, like a condom, for 1 month after your procedure to prevent unwanted pregnancy. The following instructions are to be followed if you have a known history or diagnosis of sleep apnea: For all sleep apnea patients:  ? Sleep on your side or sitting up in a chair whenever possible, especially the first 24 hours after surgery. ? Use only medicines prescribed by your doctor. ? Do not drink alcohol. ? If you have a dental device to assist you while at rest, use it at all times for the first 24 hours. For patients using CPAP machines:  ? Use your CPAP machine during all periods of sleep as usual.  ? Use your CPAP machine during all periods of daytime rest while on pain medicines. ** Follow up with your primary care doctor for continued care. IF YOU DO NOT TAKE ALL OF YOUR NARCOTIC PAIN MEDICATION, please dispose of them responsibly. There are drop off boxes in the Emergency Departments 24/7 at both [facility] and [facility]. If these locations are not convenient, other options for discarding them can be found at:  [email or web address]    Hospital or office staff may NOT accept any medications to drop off in the cabinet for you.

## 2022-12-28 NOTE — PROGRESS NOTES
This patient was seen at the request of the attending psychiatrist, Dr. Prem Pace MD for history and physical medical consultation clearance.    History and Physical    Patient: Vijay Fu  Date of Service: 2022    :     1998  PCP: Millie Reddy MD       Subjective:     Chief Complaint:  Polysubstance abuse    HPI:  Patient is admitted for her problem with cocaine, meth etcetera abuse.  She has been doing snorting and smoking mainly.  She tells me that she has a history of asthma but uses albuterol as needed only and not bothering her right now.  She also has a history of seizures since he was 18 and she gets it frequently but recently she has not had it for a while but today morning she felt like she had it but she was all awake during that which does not make sense to me.  She cannot describe me really well but she tells me that she was awake and walking and felt like she was walking in the dark and then she hit the wall and then went to sleep in the bed and woke up after 2 hours and that is why she thinks that she might have a seizure.  No postictal symptoms.  No headaches or blurred vision etcetera.  She has not been seen the neurologist as they think that is her psychosis rather than real seizure but she is on 3 different medications and these a started or on Lamictal today morning.    Past Medical History:   Diagnosis Date   • Alcohol abuse    • Asthma    • Bilateral ovarian cysts    • Bipolar disorder (CMS/HCC)    • Borderline personality disorder (CMS/HCC)    • Bulimia nervosa    • HTN (hypertension)    • Migraines    • Seizures (CMS/HCC) 2021       Past Surgical History:   Procedure Laterality Date   • No past surgeries          Prior to Admission medications    Medication Sig Start Date End Date Taking? Authorizing Provider   diphenhydrAMINE (BENADRYL) 25 MG capsule Take 150-200 mg by mouth at bedtime.   Yes Outside Provider   acetaminophen (TYLENOL) 500 MG tablet Take  Did you or one of the girls get time to fax this order yet? 500 mg by mouth every 6 hours as needed for Pain.   Yes Outside Provider   haloperidol (HALDOL) 5 MG tablet Take 5 mg by mouth 2 times daily as needed.   Yes Outside Provider   prazosin (MINIPRESS) 1 MG capsule TAKE 1 TO 2 CAPSULES BY MOUTH EVERY NIGHT AT BEDTIME. TAKE WITH 5MG CAPSULE EVERY NIGHT AT BEDTIME FOR A TOTAL OF 6 TO 7MG NIGHTLY 11/30/22  Yes Outside Provider   QUEtiapine (SEROquel) 200 MG tablet Take 200 mg by mouth in the morning and 200 mg at noon and 200 mg in the evening.   Yes Outside Provider   QUEtiapine (SEROquel) 400 MG tablet Take 400 mg by mouth nightly.   Yes Outside Provider   albuterol 108 (90 Base) MCG/ACT inhaler INHALE 2 PUFFS EVERY 4 HOURS AS NEEDED FOR SHORTNESS OF BREATH OR WHEEZING 12/2/22  Yes Millie Reddy MD   levothyroxine 25 MCG tablet TAKE 1 TABLET BY MOUTH DAILY 11/25/22  Yes Millie Reddy MD   topiramate (TOPAMAX) 50 MG tablet TAKE 3 TABLETS BY MOUTH EVERY 12 HOURS 11/25/22  Yes Millie Reddy MD   haloperidol (HALDOL) 2 MG tablet Take 2 mg by mouth at bedtime. 2/17/22  Yes Outside Provider   prazosin (MINIPRESS) 5 MG capsule Take 5 mg by mouth at bedtime. 3/22/22  Yes Outside Provider   benztropine (COGENTIN) 0.5 MG tablet Take 0.5 mg by mouth 2 times daily.   Yes Outside Provider   gabapentin (NEURONTIN) 600 MG tablet Take 1 tablet by mouth 3 times daily. Indications: anxiety 4/9/21  Yes Deric Tripp MD   lithium (ESKALITH) 450 MG CR tablet Take 1 tablet by mouth 2 times daily. Indications: Major Depressive Disorder 4/9/21  Yes Deric Tripp MD   cetirizine (ZyrTEC) 10 MG tablet Take 10 mg by mouth daily.    Outside Provider   norethindrone acetate-ethinyl estradiol (MICROGESTIN) 1.5-30 MG-MCG tablet Take 1 tablet by mouth daily. Indications: Birth Control Treatment 11/5/21   Millie Reddy MD   fluticasone propionate (FLOVENT HFA) 110 MCG/ACT inhaler Inhale 2 puffs into the lungs 2 times daily. Indications: Asthma 4/9/21   Deric Tripp MD   ferrous  sulfate 325 (65 FE) MG tablet Take 1 tablet by mouth daily (with breakfast). Indications: Iron Deficiency 4/9/21   Deric Tripp MD   DULoxetine (CYMBALTA) 60 MG capsule Take 1 capsule by mouth daily. Indications: Major Depressive Disorder 4/9/21 12/28/22  Deric Tripp MD   nicotine polacrilex (NICORETTE) 4 MG gum Take 1 each by mouth as needed for Smoking cessation. Indications: Nicotine Addiction 4/9/21 12/28/22  Deric Tripp MD       Current IP Meds (From admission, onward)    Start     Stop Status Route Frequency Ordered    12/28/22 1302  acetaminophen (TYLENOL) tablet 1,000 mg  (Patients 13 years and older (13-17 weighing 50 kg or greater))        See Hyperspace for full Linked Orders Report.    -- Verified PO EVERY 6 HOURS PRN 12/28/22 1302    12/28/22 1302  acetaminophen (TYLENOL) tablet 500 mg  (Patients 13 years and older (13-17 weighing 50 kg or greater))        See Hyperspace for full Linked Orders Report.    -- Verified PO EVERY 4 HOURS PRN 12/28/22 1302    12/28/22 1302  acetaminophen (TYLENOL) tablet 650 mg  (Patients 13 years and older (13-17 weighing 50 kg or greater))        See Hyperspace for full Linked Orders Report.    -- Verified PO EVERY 4 HOURS PRN 12/28/22 1302    12/28/22 1302  acetaminophen (TYLENOL) tablet 650 mg  (Patients 13 years and older (13-17 weighing 50 kg or greater))        See Hyperspace for full Linked Orders Report.    -- Verified PO EVERY 4 HOURS PRN 12/28/22 1302    12/28/22 1302  aluminum-magnesium hydroxide-simethicone (MAALOX) 200-200-20 MG/5ML suspension 30 mL         -- Verified PO EVERY 4 HOURS PRN 12/28/22 1302    12/28/22 1302  loperamide (IMODIUM) capsule 2 mg         -- Verified PO PRN 12/28/22 1302    12/28/22 1302  magnesium hydroxide (MILK OF MAGNESIA) 400 MG/5ML suspension 30 mL         -- Verified PO DAILY PRN 12/28/22 1302          ALLERGIES:   Allergen Reactions   • Banana   (Food And Med) ANAPHYLAXIS     unknown   • Sulfamethoxazole W/Trimethoprim HIVES    • Seasonal PRURITUS     Grass   • Bactrim Ds RASH   • Cat Dander HIVES   • Milk Intolerance   (Food) GI UPSET   • Sulfa Antibiotics HIVES       Family History   Problem Relation Age of Onset   • Diabetes Father    • Depression Maternal Aunt    • Depression Maternal Uncle    • Depression Paternal Aunt    • Depression Paternal Uncle    • Diabetes Paternal Grandfather    • Dementia/Alzheimers Paternal Grandfather         Social History     Tobacco Use   • Smoking status: Every Day     Packs/day: 1.50     Years: 5.00     Pack years: 7.50     Types: Cigarettes     Start date: 11/10/2005   • Smokeless tobacco: Never   Vaping Use   • Vaping Use: Every day   • Substances: Nicotine   Substance Use Topics   • Alcohol use: Not Currently     Comment: \"I went to rehab twice\"   • Drug use: Yes     Frequency: 7.0 times per week     Types: Marijuana, Depressants, Stimulants, Opioids, Hallucinogens, Heroin, Methamphetamines, Cocaine     Comment: Uses marijuana, nothing else currently       Review of Systems  CONSTITUTIONAL: Denies unintentional weight change, fatigue, fever, problematic headaches.  EYES:  Denies visual blurring, double vision.  ENT: Denies chronic sinus or nasal problems,dysphagia.    CV:  Denies chest discomfort with exertion, SOB, palpitations.  RESPIRATORY: Denies cough, SOB.  GI:  Denies abdominal pain, frequent nausea, vomiting, diarrhea, constipation, hematemesis, hematochezia, melena.  : Denies frequency, dysuria.  MSK: Denies joint pain, muscle aches.  NEURO:  Denies muscle weakness or paralysis, numbness or tingling  PSYCH: See HPI above.  ENDOCRINE:  Denies polyphagia, polydipsia, polyuria.  HEME/LYMPH:  Denies abnormal bruising or bleeding, lumps or bumps.  ALLERGY/IMMUN: Denies chronic sinus problems, chronic nasal problems.    All other systems reviewed and negative.      Objective:     Visit Vitals  /75 (BP Location: RUE - Right upper extremity, Patient Position: Sitting)   Pulse (!) 111    Temp 98.3 °F (36.8 °C) (Oral)   Resp 16   Ht 5' 4\" (1.626 m)   Wt 80.2 kg (176 lb 12.8 oz)   LMP 12/22/2022 (Approximate)   SpO2 99%   BMI 30.35 kg/m²       General Appearance: Well developed female. Alert, cooperative, no distress, appears stated age.  HEENT: Normocephalic, without obvious abnormality. PERRL, no icterus, EOM's intact. Hearing appears intact. No nasal discharge.  Neck: Supple, symmetrical, trachea midline, no adenopathy. Thyroid: no enlargement/tenderness/nodules.  Back: ROM normal, no CVA tenderness.  Lungs: Clear to auscultation bilaterally, respirations unlabored.  Heart: Regular rate and rhythm, S1 and S2 normal, no murmur, rub or gallop.  Abdomen: Soft, non-tender, bowel sounds active, no masses, no organomegaly.  Extremities: Extremities normal, atraumatic, no cyanosis. No stiffness, swelling.   Skin: Skin color, texture, turgor normal, no rashes or lesions.  Lymph nodes: Cervical nodes normal.  Genitorectal: Deferred.  Neurologic: CRANIAL NERVE:  Alert and oriented x3.  No gross deficit of sensory or cranial nerves II through XII:  II: Acuity and visual fields are normal.  III, IV, VI:  External ocular movements are normal, and there is no presence of any nystagmus or ptosis.  Pupils are of equal size, regularity, and react equally to the light and accommodation.  No evidence of divergent or convergent strabismus or diplopia when looking down or to either side.  V:   Facial sensation is intact and equal.  There is no deviation of the jaw or weakness of masseter or temporal muscles.  VII: Facial expression, nasolabial folds, forehead wrinkle are normal.  VIII: Hearing is intact.  There is no evidence of nystagmus and cerebellar function is intact.  IX:  Normal gag reflex.  X:   No evidence of deviation of the soft palate or laryngeal paralysis.    XI:  Shrug of shoulders is equal and strong.  XII: No deviation of the protruded tongue.  No evidence of atrophy or fine fibrillation.   Finger-nose test is normal.  Gait is normal. Rhomberg sign is negative.    Data Review:      CBC  Recent Labs   Lab 12/22/22  1102   WBC 4.3   RBC 4.27   HGB 12.6   HCT 37.2   MCV 87.1   MCH 29.5   MCHC 33.9   RDWCV 13.1      NRBCRE 0   SEG 47   TLYMPH 35   PMON 10   PEOS 7   PBASO 1   ANEUT 2.0   ALYMS 1.5   MANUELA 0.4   AEOS 0.3   ABASO 0.1       CMP  Recent Labs   Lab 12/22/22  1102   SODIUM 138   POTASSIUM 3.2*   CHLORIDE 105   CO2 25   ANIONGAP 11   GLUCOSE 91   BUN 8   CREATININE 0.93   BCRAT 9   CALCIUM 9.2   ALBUMIN 4.0   BILIRUBIN 0.3   ALKPT 101   AST 7   GPT <6       Lipid Panel  No results found    Urinalysis  Recent Labs   Lab 12/22/22  1236   COL Yellow   UAPP Clear   UGLU Negative   UBILI Negative   UKET Negative   USPG 1.013   URBC Large*   UPH 6.5   UPROT 30*   UROB 2.0*   UNITR Negative   UWBC Negative   SEPT 6 to 10*   MUCUS Present       Other  Recent Labs   Lab 12/22/22  1102   TSH 0.388        Assessment:     Cocaine abuse   Opiate abuse   Crystal meth abuse   Nicotine abuse   Seizure disorder  Asthma history   Recent low potassium which was supplemented   Borderline personality disorder   Bipolar    Plan:     Patient is medically stable and appropriate for residential care  I will increase the dose of her Lamictal to 50 mg which is the dose for seizure prevention.  Patient is advised about the risk with a substance use.  I am not too sure whether she really has seizure or this is all related to her drug abuse and psych issues.  Is up to Dr. Pace whether to continue this medications are not as I am comfortable if he wants to stop any of them.  Patient is medically stable for right now.  Thank you Dr. Pace  Patient is cleared for treatment here, and advised to follow up with primary care provider for medical concerns.    Jamal Finch MD  12/28/2022

## 2023-08-17 ENCOUNTER — OFFICE VISIT (OUTPATIENT)
Dept: FAMILY MEDICINE CLINIC | Age: 28
End: 2023-08-17
Payer: COMMERCIAL

## 2023-08-17 VITALS
HEART RATE: 87 BPM | TEMPERATURE: 97.5 F | SYSTOLIC BLOOD PRESSURE: 118 MMHG | OXYGEN SATURATION: 96 % | BODY MASS INDEX: 36.29 KG/M2 | WEIGHT: 245 LBS | HEIGHT: 69 IN | DIASTOLIC BLOOD PRESSURE: 82 MMHG

## 2023-08-17 DIAGNOSIS — E55.9 VITAMIN D DEFICIENCY: ICD-10-CM

## 2023-08-17 DIAGNOSIS — K20.0 EOSINOPHILIC ESOPHAGITIS: ICD-10-CM

## 2023-08-17 DIAGNOSIS — Z00.00 ENCOUNTER FOR WELL ADULT EXAM WITHOUT ABNORMAL FINDINGS: Primary | ICD-10-CM

## 2023-08-17 DIAGNOSIS — Z91.018 MULTIPLE FOOD ALLERGIES: ICD-10-CM

## 2023-08-17 DIAGNOSIS — K76.0 FATTY LIVER: ICD-10-CM

## 2023-08-17 DIAGNOSIS — J45.20 MILD INTERMITTENT ASTHMA WITHOUT COMPLICATION: ICD-10-CM

## 2023-08-17 LAB
25(OH)D3 SERPL-MCNC: 52.1 NG/ML
ALBUMIN SERPL-MCNC: 4.9 G/DL (ref 3.4–5)
ALBUMIN/GLOB SERPL: 2 {RATIO} (ref 1.1–2.2)
ALP SERPL-CCNC: 63 U/L (ref 40–129)
ALT SERPL-CCNC: 67 U/L (ref 10–40)
ANION GAP SERPL CALCULATED.3IONS-SCNC: 13 MMOL/L (ref 3–16)
AST SERPL-CCNC: 31 U/L (ref 15–37)
BASOPHILS # BLD: 0.1 K/UL (ref 0–0.2)
BASOPHILS NFR BLD: 0.7 %
BILIRUB SERPL-MCNC: 1.2 MG/DL (ref 0–1)
BUN SERPL-MCNC: 9 MG/DL (ref 7–20)
CALCIUM SERPL-MCNC: 9.7 MG/DL (ref 8.3–10.6)
CHLORIDE SERPL-SCNC: 103 MMOL/L (ref 99–110)
CHOLEST SERPL-MCNC: 203 MG/DL (ref 0–199)
CO2 SERPL-SCNC: 25 MMOL/L (ref 21–32)
CREAT SERPL-MCNC: 1 MG/DL (ref 0.9–1.3)
DEPRECATED RDW RBC AUTO: 13.2 % (ref 12.4–15.4)
EOSINOPHIL # BLD: 0.2 K/UL (ref 0–0.6)
EOSINOPHIL NFR BLD: 2.4 %
GFR SERPLBLD CREATININE-BSD FMLA CKD-EPI: >60 ML/MIN/{1.73_M2}
GLUCOSE SERPL-MCNC: 87 MG/DL (ref 70–99)
HCT VFR BLD AUTO: 44.8 % (ref 40.5–52.5)
HDLC SERPL-MCNC: 36 MG/DL (ref 40–60)
HGB BLD-MCNC: 15.2 G/DL (ref 13.5–17.5)
LDLC SERPL CALC-MCNC: ABNORMAL MG/DL
LDLC SERPL-MCNC: 121 MG/DL
LYMPHOCYTES # BLD: 2.6 K/UL (ref 1–5.1)
LYMPHOCYTES NFR BLD: 31.8 %
MCH RBC QN AUTO: 30.9 PG (ref 26–34)
MCHC RBC AUTO-ENTMCNC: 33.8 G/DL (ref 31–36)
MCV RBC AUTO: 91.5 FL (ref 80–100)
MONOCYTES # BLD: 0.5 K/UL (ref 0–1.3)
MONOCYTES NFR BLD: 6.3 %
NEUTROPHILS # BLD: 4.8 K/UL (ref 1.7–7.7)
NEUTROPHILS NFR BLD: 58.8 %
PLATELET # BLD AUTO: 306 K/UL (ref 135–450)
PMV BLD AUTO: 8.8 FL (ref 5–10.5)
POTASSIUM SERPL-SCNC: 4.4 MMOL/L (ref 3.5–5.1)
PROT SERPL-MCNC: 7.3 G/DL (ref 6.4–8.2)
RBC # BLD AUTO: 4.9 M/UL (ref 4.2–5.9)
SODIUM SERPL-SCNC: 141 MMOL/L (ref 136–145)
TRIGL SERPL-MCNC: 321 MG/DL (ref 0–150)
VLDLC SERPL CALC-MCNC: ABNORMAL MG/DL
WBC # BLD AUTO: 8.2 K/UL (ref 4–11)

## 2023-08-17 PROCEDURE — 36415 COLL VENOUS BLD VENIPUNCTURE: CPT | Performed by: NURSE PRACTITIONER

## 2023-08-17 PROCEDURE — 99395 PREV VISIT EST AGE 18-39: CPT | Performed by: NURSE PRACTITIONER

## 2023-08-17 SDOH — ECONOMIC STABILITY: INCOME INSECURITY: HOW HARD IS IT FOR YOU TO PAY FOR THE VERY BASICS LIKE FOOD, HOUSING, MEDICAL CARE, AND HEATING?: NOT HARD AT ALL

## 2023-08-17 SDOH — ECONOMIC STABILITY: FOOD INSECURITY: WITHIN THE PAST 12 MONTHS, YOU WORRIED THAT YOUR FOOD WOULD RUN OUT BEFORE YOU GOT MONEY TO BUY MORE.: NEVER TRUE

## 2023-08-17 SDOH — ECONOMIC STABILITY: HOUSING INSECURITY
IN THE LAST 12 MONTHS, WAS THERE A TIME WHEN YOU DID NOT HAVE A STEADY PLACE TO SLEEP OR SLEPT IN A SHELTER (INCLUDING NOW)?: NO

## 2023-08-17 SDOH — ECONOMIC STABILITY: FOOD INSECURITY: WITHIN THE PAST 12 MONTHS, THE FOOD YOU BOUGHT JUST DIDN'T LAST AND YOU DIDN'T HAVE MONEY TO GET MORE.: NEVER TRUE

## 2023-08-17 ASSESSMENT — PATIENT HEALTH QUESTIONNAIRE - PHQ9
10. IF YOU CHECKED OFF ANY PROBLEMS, HOW DIFFICULT HAVE THESE PROBLEMS MADE IT FOR YOU TO DO YOUR WORK, TAKE CARE OF THINGS AT HOME, OR GET ALONG WITH OTHER PEOPLE: 0
8. MOVING OR SPEAKING SO SLOWLY THAT OTHER PEOPLE COULD HAVE NOTICED. OR THE OPPOSITE, BEING SO FIGETY OR RESTLESS THAT YOU HAVE BEEN MOVING AROUND A LOT MORE THAN USUAL: 0
1. LITTLE INTEREST OR PLEASURE IN DOING THINGS: 0
2. FEELING DOWN, DEPRESSED OR HOPELESS: 0
5. POOR APPETITE OR OVEREATING: 0
SUM OF ALL RESPONSES TO PHQ QUESTIONS 1-9: 0
9. THOUGHTS THAT YOU WOULD BE BETTER OFF DEAD, OR OF HURTING YOURSELF: 0
7. TROUBLE CONCENTRATING ON THINGS, SUCH AS READING THE NEWSPAPER OR WATCHING TELEVISION: 0
4. FEELING TIRED OR HAVING LITTLE ENERGY: 0
3. TROUBLE FALLING OR STAYING ASLEEP: 0
SUM OF ALL RESPONSES TO PHQ QUESTIONS 1-9: 0
6. FEELING BAD ABOUT YOURSELF - OR THAT YOU ARE A FAILURE OR HAVE LET YOURSELF OR YOUR FAMILY DOWN: 0
SUM OF ALL RESPONSES TO PHQ9 QUESTIONS 1 & 2: 0

## 2023-08-17 NOTE — PROGRESS NOTES
Blood drawn per order. Needle size: 23 g butterfly   Site: L Antecubital.  First attempt successful Yes    Second attempt na    Pressure applied until bleeding stopped. Cotton ball and band aid  applied. Patient informed to call office or return if bleeding reoccurs and unable to stop.     Tubes drawn: 1 purple     1 red
cervical adenopathy. Skin:     General: Skin is warm and dry. Capillary Refill: Capillary refill takes less than 2 seconds. Neurological:      General: No focal deficit present. Mental Status: He is alert and oriented to person, place, and time. Psychiatric:         Mood and Affect: Mood normal.         Behavior: Behavior normal.       Assessment   Plan   1. Encounter for well adult exam without abnormal findings  Encouraged self care  Encouraged 30-45 minutes daily physical exercise as tolearted  Encouraged portion control, mixture of protein, carbohydrates, fruits/ veggies. Encouraged Sleep Hygiene  Annual labs ordered today  -     Comprehensive Metabolic Panel  -     LIPID PANEL  -     CBC with Auto Differential  -     Vitamin D 25 Hydroxy  2. Multiple food allergies  Referral to Allergist today  -     [facility] - Jannet Denver, MD, Allergy & Immunology, [facility]  3. Eosinophilic esophagitis  Referral to [address] [place] [address]. Jannet Denver, MD, Allergy & Immunology, [facility]  4. Mild intermittent asthma without complication  Stable today  Continue Albuterol  -     [facility] - Jannet Denver, MD, Allergy & Immunology, [facility]  5. Fatty liver  Encouraged diet/ exercise changes  -     Comprehensive Metabolic Panel  6. Vitamin D deficiency  Labs completed today  -     Vitamin D 25 Hydroxy         Personalized Preventive Plan   Current Health Maintenance Status    There is no immunization history on file for this patient.      Health Maintenance   Topic Date Due    COVID-19 Vaccine (1) Never done    Varicella vaccine (2 of 2 - 2-dose childhood series) 06/27/2000    Pneumococcal 0-64 years Vaccine (1 - PPSV23 if available, else PCV20) 09/25/2000    HIV screen  Never done    Hepatitis C screen  Never done    DTaP/Tdap/Td vaccine (7 - Td or Tdap) 03/10/2019    Flu vaccine (1) Never done    Depression Screen  08/17/2024    Hib vaccine  Completed    Hepatitis A vaccine  Aged Out

## 2023-08-21 NOTE — RESULT ENCOUNTER NOTE
NO anemia/ infection. Normal kidney function. One of your liver enzymes is elevated similar to prior. Cholesterol is also similar to last year. Likely caused from diet- recommend to consider decreasing intake of fried, fatty, and processed foods. If still no improvement will discuss medications.   Vitamin D level is normal.

## 2023-08-23 ASSESSMENT — ENCOUNTER SYMPTOMS
BLOOD IN STOOL: 0
SHORTNESS OF BREATH: 0
GASTROINTESTINAL NEGATIVE: 1
DIARRHEA: 0
COUGH: 0
CHEST TIGHTNESS: 0
ALLERGIC/IMMUNOLOGIC NEGATIVE: 1
CONSTIPATION: 0

## 2023-10-15 ENCOUNTER — HOSPITAL ENCOUNTER (OUTPATIENT)
Age: 28
Discharge: HOME | End: 2023-10-15
Payer: COMMERCIAL

## 2023-10-15 LAB
BASOPHILS # BLD: 0.2 K/UL (ref 0–0.2)
BASOPHILS NFR BLD: 1.9 %
DEPRECATED RDW RBC AUTO: 12.7 % (ref 12.4–15.4)
EOSINOPHIL # BLD: 0.4 K/UL (ref 0–0.6)
EOSINOPHIL NFR BLD: 3.8 %
HCT VFR BLD AUTO: 41.1 % (ref 40.5–52.5)
HGB BLD-MCNC: 14.5 G/DL (ref 13.5–17.5)
LYMPHOCYTES # BLD: 3.7 K/UL (ref 1–5.1)
LYMPHOCYTES NFR BLD: 38.2 %
MCH RBC QN AUTO: 30.8 PG (ref 26–34)
MCHC RBC AUTO-ENTMCNC: 35.3 G/DL (ref 31–36)
MCV RBC AUTO: 87.3 FL (ref 80–100)
MONOCYTES # BLD: 0.6 K/UL (ref 0–1.3)
MONOCYTES NFR BLD: 6.7 %
NEUTROPHILS # BLD: 4.8 K/UL (ref 1.7–7.7)
NEUTROPHILS NFR BLD: 49.4 %
PLATELET # BLD AUTO: 285 K/UL (ref 135–450)
PMV BLD AUTO: 7.9 FL (ref 5–10.5)
RBC # BLD AUTO: 4.7 M/UL (ref 4.2–5.9)
WBC # BLD AUTO: 9.6 K/UL (ref 4–11)

## 2023-10-15 PROCEDURE — 36415 COLL VENOUS BLD VENIPUNCTURE: CPT

## 2023-10-15 PROCEDURE — 86003 ALLG SPEC IGE CRUDE XTRC EA: CPT

## 2023-10-15 PROCEDURE — 85025 COMPLETE CBC W/AUTO DIFF WBC: CPT

## 2023-10-15 PROCEDURE — 82785 ASSAY OF IGE: CPT

## 2023-10-18 LAB
DEPRECATED MISC ALLERGEN IGE RAST QL: NORMAL
ENGL PLANTAIN IGE QN: 0.33 KU/L
IGE SERPL-ACNC: 1110 KU/L

## 2023-10-24 LAB
A ALTERNATA IGE QN: 0.57 KU/L (ref 0–0.34)
A FUMIGATUS IGE QN: <0.1 KU/L (ref 0–0.34)
AMER SYCAMORE IGE QN: 0.2 KU/L (ref 0–0.34)
BOXELDER IGE QN: 0.46 KU/L (ref 0–0.34)
CAT DANDER IGE QN: 9.5 KU/L (ref 0–0.34)
COMMON RAGWEED IGE QN: 6.99 KU/L (ref 0–0.34)
COTTONWOOD IGE QN: 0.43 KU/L (ref 0–0.34)
D FARINAE IGE QN: 35 KU/L (ref 0–0.34)
D PTERONYSS IGE QN: 50 KU/L (ref 0–0.34)
DOG DANDER IGE QN: 25.7 KU/L (ref 0–0.34)
MULBERRY TREE: <0.1 KU/L (ref 0–0.34)
RED CEDAR IGE QN: 3.66 KU/L (ref 0–0.34)
ROACH IGE QN: 12.5 KU/L (ref 0–0.34)
TIMOTHY IGE QN: 6.96 KU/L (ref 0–0.34)
WHITE ASH IGE QN: 1.43 KU/L (ref 0–0.34)
WHITE ELM IGE QN: 0.32 KU/L (ref 0–0.34)
WHITE MULBERRY IGE QN: <0.1 KU/L (ref 0–0.34)
WHITE OAK IGE QN: 0.25 KU/L (ref 0–0.34)

## 2024-02-07 ENCOUNTER — OFFICE VISIT (OUTPATIENT)
Dept: ENT CLINIC | Age: 29
End: 2024-02-07
Payer: COMMERCIAL

## 2024-02-07 VITALS
HEIGHT: 69 IN | DIASTOLIC BLOOD PRESSURE: 94 MMHG | HEART RATE: 104 BPM | WEIGHT: 266 LBS | SYSTOLIC BLOOD PRESSURE: 149 MMHG | BODY MASS INDEX: 39.4 KG/M2

## 2024-02-07 DIAGNOSIS — J33.9 NASAL POLYPS: Primary | ICD-10-CM

## 2024-02-07 DIAGNOSIS — R09.81 NASAL CONGESTION: ICD-10-CM

## 2024-02-07 PROCEDURE — 31231 NASAL ENDOSCOPY DX: CPT | Performed by: OTOLARYNGOLOGY

## 2024-02-07 PROCEDURE — G8417 CALC BMI ABV UP PARAM F/U: HCPCS | Performed by: OTOLARYNGOLOGY

## 2024-02-07 PROCEDURE — G8484 FLU IMMUNIZE NO ADMIN: HCPCS | Performed by: OTOLARYNGOLOGY

## 2024-02-07 PROCEDURE — 1036F TOBACCO NON-USER: CPT | Performed by: OTOLARYNGOLOGY

## 2024-02-07 PROCEDURE — G8427 DOCREV CUR MEDS BY ELIG CLIN: HCPCS | Performed by: OTOLARYNGOLOGY

## 2024-02-07 PROCEDURE — 99203 OFFICE O/P NEW LOW 30 MIN: CPT | Performed by: OTOLARYNGOLOGY

## 2024-02-07 ASSESSMENT — ENCOUNTER SYMPTOMS
SORE THROAT: 0
SINUS PRESSURE: 0
EYE ITCHING: 0
VOICE CHANGE: 0
SHORTNESS OF BREATH: 0
TROUBLE SWALLOWING: 0
FACIAL SWELLING: 0
APNEA: 0
COUGH: 0

## 2024-02-07 NOTE — PROGRESS NOTES
[facility]  [address], [address]  [place] [address]  P: [phone]       Patient     Elías Lui  1995    ChiefComplaint     Chief Complaint   Patient presents with    New Patient     Nasal polyps        History of Present Illness     Elías is a 28-year-old male here today for evaluation of possible nasal polyps.  History significant for nasal polyps at the age of 13 for which she underwent sinus surgery and excision.  Since that time has been doing okay with some with nasal congestion and drainage.  Not currently using any nasal sprays but has been recommended for Flonase previously.  Currently following with Dr. Freddy Messer for eosinophilic esophagitis and it is in the process of possible Dupixent therapy.    Past Medical History     Past Medical History:   Diagnosis Date    Asthma     Eosinophilic esophagitis     confirmed by biopsy    Fatty liver     Food allergy     Milk, peanut, eggs, shellfish, pork, turkey, seasme, melon, legumes    Vitamin D deficiency     recurrent       Past Surgical History     Past Surgical History:   Procedure Laterality Date    HYDROCELE EXCISION      KNEE SURGERY      SINUS SURGERY      TONSILLECTOMY      UPPER GASTROINTESTINAL ENDOSCOPY N/A 10/28/2022    EGD BIOPSY performed by Som Menard DO at [facility] ENDOSCOPY       Family History     History reviewed. No pertinent family history.    Social History     Social History     Tobacco Use    Smoking status: Never    Smokeless tobacco: Never   Substance Use Topics    Alcohol use: Yes     Comment: rarely    Drug use: No        Allergies     Allergies   Allergen Reactions    Latex     Eggs Or Egg-Derived Products Anaphylaxis    Milk-Related Compounds Anaphylaxis    Other Anaphylaxis     Tree nuts - anaphylaxis, Melon -rash, Peas-n/v. Patient has a total of 26 food allergies.      Peanut-Containing Drug Products Anaphylaxis    Shellfish-Derived Products Anaphylaxis    Pork-Derived

## 2024-11-04 ASSESSMENT — PATIENT HEALTH QUESTIONNAIRE - PHQ9
SUM OF ALL RESPONSES TO PHQ9 QUESTIONS 1 & 2: 0
SUM OF ALL RESPONSES TO PHQ QUESTIONS 1-9: 0
SUM OF ALL RESPONSES TO PHQ QUESTIONS 1-9: 0
1. LITTLE INTEREST OR PLEASURE IN DOING THINGS: NOT AT ALL
SUM OF ALL RESPONSES TO PHQ QUESTIONS 1-9: 0
2. FEELING DOWN, DEPRESSED OR HOPELESS: NOT AT ALL
2. FEELING DOWN, DEPRESSED OR HOPELESS: NOT AT ALL
SUM OF ALL RESPONSES TO PHQ9 QUESTIONS 1 & 2: 0
1. LITTLE INTEREST OR PLEASURE IN DOING THINGS: NOT AT ALL
SUM OF ALL RESPONSES TO PHQ QUESTIONS 1-9: 0

## 2024-11-07 ENCOUNTER — TELEPHONE (OUTPATIENT)
Dept: FAMILY MEDICINE CLINIC | Age: 29
End: 2024-11-07

## 2024-11-07 ENCOUNTER — OFFICE VISIT (OUTPATIENT)
Dept: FAMILY MEDICINE CLINIC | Age: 29
End: 2024-11-07
Payer: COMMERCIAL

## 2024-11-07 VITALS
HEART RATE: 97 BPM | DIASTOLIC BLOOD PRESSURE: 82 MMHG | SYSTOLIC BLOOD PRESSURE: 118 MMHG | WEIGHT: 249 LBS | BODY MASS INDEX: 36.77 KG/M2 | TEMPERATURE: 97.3 F | OXYGEN SATURATION: 97 %

## 2024-11-07 DIAGNOSIS — Z00.00 ENCOUNTER FOR WELL ADULT EXAM WITHOUT ABNORMAL FINDINGS: Primary | ICD-10-CM

## 2024-11-07 DIAGNOSIS — K20.0 EOSINOPHILIC ESOPHAGITIS: ICD-10-CM

## 2024-11-07 DIAGNOSIS — Z91.018 MULTIPLE FOOD ALLERGIES: ICD-10-CM

## 2024-11-07 DIAGNOSIS — K76.0 FATTY LIVER: ICD-10-CM

## 2024-11-07 DIAGNOSIS — J45.20 MILD INTERMITTENT ASTHMA WITHOUT COMPLICATION: ICD-10-CM

## 2024-11-07 DIAGNOSIS — E55.9 VITAMIN D DEFICIENCY: ICD-10-CM

## 2024-11-07 PROCEDURE — 36415 COLL VENOUS BLD VENIPUNCTURE: CPT | Performed by: NURSE PRACTITIONER

## 2024-11-07 PROCEDURE — 99395 PREV VISIT EST AGE 18-39: CPT | Performed by: NURSE PRACTITIONER

## 2024-11-07 PROCEDURE — G8484 FLU IMMUNIZE NO ADMIN: HCPCS | Performed by: NURSE PRACTITIONER

## 2024-11-07 RX ORDER — FEXOFENADINE HCL 180 MG/1
180 TABLET ORAL DAILY
COMMUNITY

## 2024-11-07 RX ORDER — EPINEPHRINE 0.3 MG/.3ML
0.3 INJECTION SUBCUTANEOUS PRN
COMMUNITY
End: 2024-11-07 | Stop reason: SDUPTHER

## 2024-11-07 RX ORDER — FLUTICASONE PROPIONATE 50 MCG
1 SPRAY, SUSPENSION (ML) NASAL DAILY PRN
COMMUNITY

## 2024-11-07 RX ORDER — DUPILUMAB 300 MG/2ML
300 INJECTION, SOLUTION SUBCUTANEOUS
COMMUNITY
Start: 2024-11-03

## 2024-11-07 RX ORDER — EPINEPHRINE 0.3 MG/.3ML
0.3 INJECTION SUBCUTANEOUS PRN
Qty: 2 EACH | Refills: 3 | Status: SHIPPED | OUTPATIENT
Start: 2024-11-07

## 2024-11-07 SDOH — ECONOMIC STABILITY: FOOD INSECURITY: WITHIN THE PAST 12 MONTHS, YOU WORRIED THAT YOUR FOOD WOULD RUN OUT BEFORE YOU GOT MONEY TO BUY MORE.: NEVER TRUE

## 2024-11-07 SDOH — ECONOMIC STABILITY: FOOD INSECURITY: WITHIN THE PAST 12 MONTHS, THE FOOD YOU BOUGHT JUST DIDN'T LAST AND YOU DIDN'T HAVE MONEY TO GET MORE.: NEVER TRUE

## 2024-11-07 SDOH — ECONOMIC STABILITY: INCOME INSECURITY: HOW HARD IS IT FOR YOU TO PAY FOR THE VERY BASICS LIKE FOOD, HOUSING, MEDICAL CARE, AND HEATING?: NOT HARD AT ALL

## 2024-11-07 NOTE — PATIENT INSTRUCTIONS
Dear Elías,    Thank you for coming in.  We appreciate your time and effort in helping us with your care.  Here are some follow up items following our discussion:    Vaccinations to Consider: COVID, Flu, Pneumonia  SUPPLEMENT SHEET to lower cholesterol  [employer] Ultimate Omega with Lemon (FISH OIL) 1-2 capsules/day  Co-Enzyme Q10 - 100-200mg/day  Red Yeast Rice  Turmeric/Curcumin- 1-2 tablets/day  Magnesium- 400mg/day  Vitamin D3- 0293-0170 units/day      Please compare this printed medication list with your medications at home to be sure they are the same.  If you have any medications that are different please contact us immediately at [phone].  Or you can send us a Novel message.      If you need to schedule imaging or testing you can call [employer]'s Main Scheduling Line at [phone], option 2    Also review your allergies that we have listed, these may also include medications that you have not been able to tolerate, make sure everything listed is correct. If you have any allergies that are different please contact us immediately at [phone].    You may receive a survey in the mail or by email asking about your experience during your visit today. Please complete and return to us so we know how we are serving you.       Well Visit, Ages 18 to 65: Care Instructions  Well visits can help you stay healthy. Your doctor has checked your overall health and may have suggested ways to take good care of yourself. Your doctor also may have recommended tests. You can help prevent illness with healthy eating, good sleep, vaccinations, regular exercise, and other steps.    Get the tests that you and your doctor decide on. Depending on your age and risks, examples might include screening for diabetes; hepatitis C; HIV; and cervical, breast, lung, and colon cancer. Screening helps find diseases before any symptoms appear.   Eat healthy foods. Choose fruits, vegetables, whole grains, lean protein, and low-fat

## 2024-11-07 NOTE — ASSESSMENT & PLAN NOTE
Continue care with allergist    Orders:    EPINEPHrine (EPIPEN 2-BENNIE) 0.3 MG/0.3ML SOAJ injection; Inject 0.3 mLs into the muscle as needed (swelling and hives) Use as directed for allergic reaction

## 2024-11-07 NOTE — ASSESSMENT & PLAN NOTE
Overall controlled today.  No flares over past year requiring steroids.  Rare use of Albuterol inhaler and nebulizer.  Reviewed recommended vaccinations.      Orders:    CBC with Auto Differential    Comprehensive Metabolic Panel    TSH with Reflex to FT4 ([facility] Only)    LIPID PANEL    Vitamin D 25 Hydroxy

## 2024-11-07 NOTE — ASSESSMENT & PLAN NOTE
Encouraged diet/ exercise changes.  Lifestyle changes limited due to multiple food allergies.  Hopefully as allergist improve while working with Allergist his diet can improve which will reduce fatty liver    Orders:    CBC with Auto Differential    Comprehensive Metabolic Panel    TSH with Reflex to FT4 ([facility] Only)    LIPID PANEL    Vitamin D 25 Hydroxy

## 2024-11-07 NOTE — ASSESSMENT & PLAN NOTE
Continue care with Allergist, appreciate assistance    Orders:    EPINEPHrine (EPIPEN 2-BENNIE) 0.3 MG/0.3ML SOAJ injection; Inject 0.3 mLs into the muscle as needed (swelling and hives) Use as directed for allergic reaction

## 2024-11-07 NOTE — PROGRESS NOTES
Well Adult Note  Name: Elías Lui Today’s Date: 2024   MRN: [id number] Sex: Male   Age: 29 y.o. Ethnicity: Non- / Non    : 1995 Race: White (non-)      Elías Lui is here for a well adult exam.       Assessment & Plan   Assessment & Plan  Encounter for well adult exam without abnormal findings   Encouraged self care  Encouraged 30-45 minutes daily physical exercise as tolearted  Encouraged portion control, mixture of protein, carbohydrates, fruits/ veggies.   Encouraged Sleep Hygiene  Reviewed recommended vaccinations  Orders:    CBC with Auto Differential    Comprehensive Metabolic Panel    TSH with Reflex to FT4 ([facility] Only)    LIPID PANEL    Vitamin D 25 Hydroxy    Multiple food allergies    Continue care with Allergist, appreciate assistance    Orders:    EPINEPHrine (EPIPEN 2-BENNIE) 0.3 MG/0.3ML SOAJ injection; Inject 0.3 mLs into the muscle as needed (swelling and hives) Use as directed for allergic reaction    Eosinophilic esophagitis    Continue care with allergist    Orders:    EPINEPHrine (EPIPEN 2-BENNIE) 0.3 MG/0.3ML SOAJ injection; Inject 0.3 mLs into the muscle as needed (swelling and hives) Use as directed for allergic reaction    Mild intermittent asthma without complication    Overall controlled today.  No flares over past year requiring steroids.  Rare use of Albuterol inhaler and nebulizer.  Reviewed recommended vaccinations.      Orders:    CBC with Auto Differential    Comprehensive Metabolic Panel    TSH with Reflex to FT4 ([facility] Only)    LIPID PANEL    Vitamin D 25 Hydroxy    Fatty liver    Encouraged diet/ exercise changes.  Lifestyle changes limited due to multiple food allergies.  Hopefully as allergist improve while working with Allergist his diet can improve which will reduce fatty liver    Orders:    CBC with Auto Differential    Comprehensive Metabolic Panel    TSH with Reflex to FT4 ([facility] Only)    LIPID PANEL

## 2024-11-08 LAB
25(OH)D3 SERPL-MCNC: 46.3 NG/ML
ALBUMIN SERPL-MCNC: 4.8 G/DL (ref 3.4–5)
ALBUMIN/GLOB SERPL: 1.9 {RATIO} (ref 1.1–2.2)
ALP SERPL-CCNC: 74 U/L (ref 40–129)
ALT SERPL-CCNC: 96 U/L (ref 10–40)
ANION GAP SERPL CALCULATED.3IONS-SCNC: 12 MMOL/L (ref 3–16)
AST SERPL-CCNC: 37 U/L (ref 15–37)
BASOPHILS # BLD: 0.1 K/UL (ref 0–0.2)
BASOPHILS NFR BLD: 0.7 %
BILIRUB SERPL-MCNC: 0.6 MG/DL (ref 0–1)
BUN SERPL-MCNC: 10 MG/DL (ref 7–20)
CALCIUM SERPL-MCNC: 9.6 MG/DL (ref 8.3–10.6)
CHLORIDE SERPL-SCNC: 104 MMOL/L (ref 99–110)
CHOLEST SERPL-MCNC: 223 MG/DL (ref 0–199)
CO2 SERPL-SCNC: 23 MMOL/L (ref 21–32)
CREAT SERPL-MCNC: 0.9 MG/DL (ref 0.9–1.3)
DEPRECATED RDW RBC AUTO: 13.4 % (ref 12.4–15.4)
EOSINOPHIL # BLD: 0.2 K/UL (ref 0–0.6)
EOSINOPHIL NFR BLD: 2.8 %
GFR SERPLBLD CREATININE-BSD FMLA CKD-EPI: >90 ML/MIN/{1.73_M2}
GLUCOSE SERPL-MCNC: 83 MG/DL (ref 70–99)
HCT VFR BLD AUTO: 45.4 % (ref 40.5–52.5)
HDLC SERPL-MCNC: 31 MG/DL (ref 40–60)
HGB BLD-MCNC: 15.6 G/DL (ref 13.5–17.5)
LDLC SERPL CALC-MCNC: ABNORMAL MG/DL
LDLC SERPL-MCNC: 116 MG/DL
LYMPHOCYTES # BLD: 2.4 K/UL (ref 1–5.1)
LYMPHOCYTES NFR BLD: 30.5 %
MCH RBC QN AUTO: 31.7 PG (ref 26–34)
MCHC RBC AUTO-ENTMCNC: 34.4 G/DL (ref 31–36)
MCV RBC AUTO: 92 FL (ref 80–100)
MONOCYTES # BLD: 0.5 K/UL (ref 0–1.3)
MONOCYTES NFR BLD: 6.9 %
NEUTROPHILS # BLD: 4.6 K/UL (ref 1.7–7.7)
NEUTROPHILS NFR BLD: 59.1 %
PLATELET # BLD AUTO: 325 K/UL (ref 135–450)
PMV BLD AUTO: 8.3 FL (ref 5–10.5)
POTASSIUM SERPL-SCNC: 4.5 MMOL/L (ref 3.5–5.1)
PROT SERPL-MCNC: 7.3 G/DL (ref 6.4–8.2)
RBC # BLD AUTO: 4.93 M/UL (ref 4.2–5.9)
SODIUM SERPL-SCNC: 139 MMOL/L (ref 136–145)
TRIGL SERPL-MCNC: 578 MG/DL (ref 0–150)
TSH SERPL DL<=0.005 MIU/L-ACNC: 0.94 UIU/ML (ref 0.27–4.2)
VLDLC SERPL CALC-MCNC: ABNORMAL MG/DL
WBC # BLD AUTO: 7.8 K/UL (ref 4–11)

## 2024-11-08 NOTE — TELEPHONE ENCOUNTER
Submitted PA for EPINEPHrine 0.3MG/0.3ML auto-injectors   Via CMM Key: [id number]  STATUS: MESSAGE FROM PLAN     Outcome  Additional Information Required  This medication or product is on your plan's list of covered drugs. Prior authorization is not required at this time. If your pharmacy has questions regarding the processing of your prescription, please have them call the [facility] pharmacy help desk at [phone].    If this requires a response please respond to the pool ( [facility] PRE-AUTH).      Thank you please advise patient.

## 2024-11-08 NOTE — RESULT ENCOUNTER NOTE
No anemia/ infection.  Normal kidney/ liver function.  Cholesterol is worse from last year.  Vitamin D and thyroid levels are normal.      General Comment:  Overall just keep working on diet/ exercise changes to be more consistent with what you can eat.  We can always try supplements like Fish Oil, CoQ10 for the cholesterol but I think the eating is the biggest key.      Thanks for coming in.

## 2024-11-11 NOTE — RESULT ENCOUNTER NOTE
Coenzyme Q10 150 mg by mouth twice daily    Nadeem has a [employer] he can go to which has classes.  There are also online apps he can use for exercise

## 2024-11-13 ENCOUNTER — OFFICE VISIT (OUTPATIENT)
Dept: ENT CLINIC | Age: 29
End: 2024-11-13
Payer: COMMERCIAL

## 2024-11-13 VITALS
BODY MASS INDEX: 36.88 KG/M2 | HEIGHT: 69 IN | SYSTOLIC BLOOD PRESSURE: 124 MMHG | HEART RATE: 92 BPM | DIASTOLIC BLOOD PRESSURE: 84 MMHG | WEIGHT: 249 LBS

## 2024-11-13 DIAGNOSIS — H69.93 DYSFUNCTION OF BOTH EUSTACHIAN TUBES: ICD-10-CM

## 2024-11-13 DIAGNOSIS — J33.9 NASAL POLYPS: Primary | ICD-10-CM

## 2024-11-13 DIAGNOSIS — L29.9 EAR ITCHING: ICD-10-CM

## 2024-11-13 PROCEDURE — 1036F TOBACCO NON-USER: CPT | Performed by: OTOLARYNGOLOGY

## 2024-11-13 PROCEDURE — G8484 FLU IMMUNIZE NO ADMIN: HCPCS | Performed by: OTOLARYNGOLOGY

## 2024-11-13 PROCEDURE — G8417 CALC BMI ABV UP PARAM F/U: HCPCS | Performed by: OTOLARYNGOLOGY

## 2024-11-13 PROCEDURE — 31231 NASAL ENDOSCOPY DX: CPT | Performed by: OTOLARYNGOLOGY

## 2024-11-13 PROCEDURE — G8427 DOCREV CUR MEDS BY ELIG CLIN: HCPCS | Performed by: OTOLARYNGOLOGY

## 2024-11-13 PROCEDURE — 99214 OFFICE O/P EST MOD 30 MIN: CPT | Performed by: OTOLARYNGOLOGY

## 2024-11-13 RX ORDER — AZELASTINE 1 MG/ML
1 SPRAY, METERED NASAL 2 TIMES DAILY
Qty: 60 ML | Refills: 3 | Status: SHIPPED | OUTPATIENT
Start: 2024-11-13

## 2024-11-13 ASSESSMENT — ENCOUNTER SYMPTOMS
APNEA: 0
FACIAL SWELLING: 0
COUGH: 0
SHORTNESS OF BREATH: 0
TROUBLE SWALLOWING: 0
EYE ITCHING: 0
SINUS PRESSURE: 0
VOICE CHANGE: 0
SORE THROAT: 0

## 2024-11-13 NOTE — PROGRESS NOTES
steroid spray plan for Astelin trial  - azelastine (ASTELIN) 0.1 % nasal spray; 1 spray by Nasal route 2 times daily Use in each nostril as directed  Dispense: 60 mL; Refill: 3    3. Ear itching  Discussed options of nothing, oil versus Elocon cream wishes to proceed with oils      Return in 1 month if symptoms persist with hearing test      Gina Cee,   11/13/24      Portions of this note were dictated using Dragon. There may be linguistic errors secondary to the use of this program.

## 2025-02-22 ENCOUNTER — HOSPITAL ENCOUNTER (EMERGENCY)
Age: 30
Discharge: HOME | End: 2025-02-23
Attending: STUDENT IN AN ORGANIZED HEALTH CARE EDUCATION/TRAINING PROGRAM
Payer: COMMERCIAL

## 2025-02-22 ENCOUNTER — APPOINTMENT (OUTPATIENT)
Dept: GENERAL RADIOLOGY | Age: 30
End: 2025-02-22
Payer: COMMERCIAL

## 2025-02-22 VITALS
WEIGHT: 251 LBS | BODY MASS INDEX: 37.18 KG/M2 | RESPIRATION RATE: 16 BRPM | SYSTOLIC BLOOD PRESSURE: 135 MMHG | TEMPERATURE: 98.2 F | DIASTOLIC BLOOD PRESSURE: 84 MMHG | OXYGEN SATURATION: 100 % | HEIGHT: 69 IN | HEART RATE: 84 BPM

## 2025-02-22 DIAGNOSIS — S61.217A LACERATION OF LEFT LITTLE FINGER WITHOUT FOREIGN BODY WITHOUT DAMAGE TO NAIL, INITIAL ENCOUNTER: Primary | ICD-10-CM

## 2025-02-22 PROCEDURE — 13131 CMPLX RPR F/C/C/M/N/AX/G/H/F: CPT

## 2025-02-22 PROCEDURE — 73140 X-RAY EXAM OF FINGER(S): CPT

## 2025-02-22 PROCEDURE — 99284 EMERGENCY DEPT VISIT MOD MDM: CPT

## 2025-02-22 ASSESSMENT — PAIN DESCRIPTION - LOCATION: LOCATION: FINGER (COMMENT WHICH ONE)

## 2025-02-22 ASSESSMENT — PAIN DESCRIPTION - FREQUENCY: FREQUENCY: CONTINUOUS

## 2025-02-22 ASSESSMENT — PAIN SCALES - GENERAL: PAINLEVEL_OUTOF10: 4

## 2025-02-22 ASSESSMENT — PAIN DESCRIPTION - PAIN TYPE: TYPE: ACUTE PAIN

## 2025-02-22 ASSESSMENT — PAIN DESCRIPTION - ORIENTATION: ORIENTATION: LEFT

## 2025-02-22 ASSESSMENT — PAIN - FUNCTIONAL ASSESSMENT: PAIN_FUNCTIONAL_ASSESSMENT: 0-10

## 2025-02-22 ASSESSMENT — PAIN DESCRIPTION - ONSET: ONSET: GRADUAL

## 2025-02-22 ASSESSMENT — PAIN DESCRIPTION - DESCRIPTORS: DESCRIPTORS: ACHING

## 2025-02-23 PROCEDURE — 90471 IMMUNIZATION ADMIN: CPT | Performed by: STUDENT IN AN ORGANIZED HEALTH CARE EDUCATION/TRAINING PROGRAM

## 2025-02-23 PROCEDURE — 6360000002 HC RX W HCPCS: Performed by: STUDENT IN AN ORGANIZED HEALTH CARE EDUCATION/TRAINING PROGRAM

## 2025-02-23 PROCEDURE — 90715 TDAP VACCINE 7 YRS/> IM: CPT | Performed by: STUDENT IN AN ORGANIZED HEALTH CARE EDUCATION/TRAINING PROGRAM

## 2025-02-23 RX ORDER — LIDOCAINE HYDROCHLORIDE 10 MG/ML
5 INJECTION, SOLUTION EPIDURAL; INFILTRATION; INTRACAUDAL; PERINEURAL ONCE
Status: DISCONTINUED | OUTPATIENT
Start: 2025-02-23 | End: 2025-02-23 | Stop reason: HOSPADM

## 2025-02-23 RX ADMIN — TETANUS TOXOID, REDUCED DIPHTHERIA TOXOID AND ACELLULAR PERTUSSIS VACCINE, ADSORBED 0.5 ML: 5; 2.5; 8; 8; 2.5 SUSPENSION INTRAMUSCULAR at 00:06

## 2025-02-23 ASSESSMENT — PAIN SCALES - GENERAL: PAINLEVEL_OUTOF10: 0

## 2025-02-23 ASSESSMENT — PAIN - FUNCTIONAL ASSESSMENT: PAIN_FUNCTIONAL_ASSESSMENT: NONE - DENIES PAIN

## 2025-02-23 NOTE — DISCHARGE INSTRUCTIONS
Follow-up in 7 to 10 days for suture removal.  Return to emergency department for any redness, pus coming from the wound, fevers or chills or any signs of infection also return for any motor or sensory changes, weakness or numbness or any new change or worsening symptoms we are always here for reevaluation never hesitate to return.

## 2025-02-23 NOTE — ED PROVIDER NOTES
Emergency Department Encounter    Patient: Elías Lui  MRN: [id number]  : 1995  Date of Evaluation: 2025  ED Provider:  Wilfredo Mcintyre MD    Triage Chief Complaint:   Laceration (Pt states he cut finger on left hand with kitchen knife. )    [place]:  Elías Lui is a 30 y.o. male presenting with complaints of laceration of the left fifth digit.  Patient states he was using a kitchen knife to cut the duck he had just cooked.  States the knife slipped and cut his left fifth digit.  Last tetanus was in .  Denies motor or sensory changes.  Denies injury to any other region.  Denies erythema, increased warmth or signs of infection states this just occurred prior to presentation.    ROS - see HPI, below listed is current ROS at time of my eval:  General:  No fevers, no chills, no weakness  Eyes:  No recent vison changes, no discharge  Cardiovascular:  No chest pain, no palpitations  Respiratory:  No shortness of breath, no cough, no wheezing  Gastrointestinal:  No pain, no nausea, no vomiting, no diarrhea  Musculoskeletal:  Mild pain along laceration otherwise No muscle pain, no joint pain  Skin:  No rash, no pruritis, no  bruising  Extremities:  no edema, + pain    Past Medical History:   Diagnosis Date    Asthma     Eosinophilic esophagitis     confirmed by biopsy    Fatty liver     Food allergy     Milk, peanut, eggs, shellfish, pork, turkey, seasme, melon, legumes    Vitamin D deficiency     recurrent     Past Surgical History:   Procedure Laterality Date    HYDROCELE EXCISION      KNEE SURGERY      SINUS SURGERY      TONSILLECTOMY      UPPER GASTROINTESTINAL ENDOSCOPY N/A 10/28/2022    EGD BIOPSY performed by Som Menard DO at [facility] ENDOSCOPY     Family History   Problem Relation Age of Onset    Hypertension Mother     High Cholesterol Mother     Heart Failure Mother     Diabetes Mother     High Cholesterol Father     Hypertension Father     Asthma Father

## (undated) DEVICE — ENDOSCOPIC KIT 2 12 FT OP4 DE2 GWN SYR

## (undated) DEVICE — CANNULA NSL AD TBNG L7FT PVC STR NONFLARED PRNG O2 DEL W STD

## (undated) DEVICE — CONMED SCOPE SAVER BITE BLOCK, 20X27 MM: Brand: SCOPE SAVER

## (undated) DEVICE — FORCEPS BX L240CM JAW DIA2.8MM L CAP W/ NDL MIC MESH TOOTH

## (undated) DEVICE — ELECTRODE,ECG,STRESS,FOAM,3PK: Brand: MEDLINE